# Patient Record
Sex: FEMALE | Race: WHITE | NOT HISPANIC OR LATINO | Employment: OTHER | URBAN - METROPOLITAN AREA
[De-identification: names, ages, dates, MRNs, and addresses within clinical notes are randomized per-mention and may not be internally consistent; named-entity substitution may affect disease eponyms.]

---

## 2023-04-06 LAB
ABO GROUP (TYPE) IN BLOOD: NORMAL
ABO: NORMAL
ANION GAP IN SER/PLAS: 13 MMOL/L (ref 10–20)
ANION GAP SERPL CALCULATED.3IONS-SCNC: 13 MMOL/L (ref 10–20)
ANTIBODY SCREEN: NORMAL
ANTIBODY SCREEN: NORMAL
BICARBONATE: 29 MMOL/L (ref 21–32)
CALCIUM (MG/DL) IN SER/PLAS: 10 MG/DL (ref 8.6–10.3)
CALCIUM SERPL-MCNC: 10 MG/DL (ref 8.6–10.3)
CARBON DIOXIDE, TOTAL (MMOL/L) IN SER/PLAS: 29 MMOL/L (ref 21–32)
CHLORIDE (MMOL/L) IN SER/PLAS: 100 MMOL/L (ref 98–107)
CHLORIDE BLD-SCNC: 100 MMOL/L (ref 98–107)
CREAT SERPL-MCNC: 0.86 MG/DL (ref 0.5–1)
CREATININE (MG/DL) IN SER/PLAS: 0.86 MG/DL (ref 0.5–1.05)
EGFR FEMALE: 70 ML/MIN/1.73M2
ERYTHROCYTE DISTRIBUTION WIDTH (RATIO) BY AUTOMATED COUNT: 12.9 % (ref 11.5–14.5)
ERYTHROCYTE MEAN CORPUSCULAR HEMOGLOBIN CONCENTRATION (G/DL) BY AUTOMATED: 32.5 G/DL (ref 32–36)
ERYTHROCYTE MEAN CORPUSCULAR VOLUME (FL) BY AUTOMATED COUNT: 94 FL (ref 80–100)
ERYTHROCYTE [DISTWIDTH] IN BLOOD BY AUTOMATED COUNT: 12.9 % (ref 11.5–14)
ERYTHROCYTES (10*6/UL) IN BLOOD BY AUTOMATED COUNT: 4.54 X10E12/L (ref 4–5.2)
GFR FEMALE: 70 ML/MIN/1.73M2
GLUCOSE (MG/DL) IN SER/PLAS: 96 MG/DL (ref 74–99)
GLUCOSE: 96 MG/DL (ref 74–99)
HCT VFR BLD CALC: 42.5 % (ref 36–46)
HEMATOCRIT (%) IN BLOOD BY AUTOMATED COUNT: 42.5 % (ref 36–46)
HEMOGLOBIN (G/DL) IN BLOOD: 13.8 G/DL (ref 12–16)
HEMOGLOBIN: 13.8 G/DL (ref 12–16)
LEUKOCYTES (10*3/UL) IN BLOOD BY AUTOMATED COUNT: 8 X10E9/L (ref 4.4–11.3)
MCHC RBC AUTO-ENTMCNC: 32.5 G/DL (ref 32–36)
MCV RBC AUTO: 94 FL (ref 80–100)
NRBC (PER 100 WBCS) BY AUTOMATED COUNT: 0 /100 WBC (ref 0–0)
NUCLEATED RBCS: 0 /100 WBC (ref 0–0)
PLATELET # BLD: 341 X10E9/L (ref 150–450)
PLATELETS (10*3/UL) IN BLOOD AUTOMATED COUNT: 341 X10E9/L (ref 150–450)
POTASSIUM (MMOL/L) IN SER/PLAS: 4.1 MMOL/L (ref 3.5–5.3)
POTASSIUM SERPL-SCNC: 4.1 MMOL/L (ref 3.5–5.3)
RBC # BLD: 4.54 X10E12/L (ref 4–5.2)
RH FACTOR: NORMAL
RH TYPE: NORMAL
SODIUM (MMOL/L) IN SER/PLAS: 138 MMOL/L (ref 136–145)
SODIUM BLD-SCNC: 138 MMOL/L (ref 136–145)
UREA NITROGEN (MG/DL) IN SER/PLAS: 18 MG/DL (ref 6–23)
UREA NITROGEN: 18 MG/DL (ref 6–23)
WBC: 8 X10E9/L (ref 4.4–11.3)

## 2023-04-15 LAB — SARS-COV-2 RESULT: NOT DETECTED

## 2023-04-17 ENCOUNTER — HOSPITAL ENCOUNTER (OUTPATIENT)
Dept: DATA CONVERSION | Facility: HOSPITAL | Age: 73
End: 2023-04-18
Attending: ORTHOPAEDIC SURGERY | Admitting: ORTHOPAEDIC SURGERY
Payer: MEDICARE

## 2023-04-17 DIAGNOSIS — M17.12 UNILATERAL PRIMARY OSTEOARTHRITIS, LEFT KNEE: ICD-10-CM

## 2023-04-17 DIAGNOSIS — M85.662 OTHER CYST OF BONE, LEFT LOWER LEG: ICD-10-CM

## 2023-04-17 DIAGNOSIS — E05.90 THYROTOXICOSIS, UNSPECIFIED WITHOUT THYROTOXIC CRISIS OR STORM: ICD-10-CM

## 2023-04-17 DIAGNOSIS — F32.A DEPRESSION, UNSPECIFIED: ICD-10-CM

## 2023-04-17 DIAGNOSIS — Z79.01 LONG TERM (CURRENT) USE OF ANTICOAGULANTS: ICD-10-CM

## 2023-04-17 DIAGNOSIS — I48.91 UNSPECIFIED ATRIAL FIBRILLATION (MULTI): ICD-10-CM

## 2023-04-17 DIAGNOSIS — H91.90 UNSPECIFIED HEARING LOSS, UNSPECIFIED EAR: ICD-10-CM

## 2023-04-17 DIAGNOSIS — Z90.49 ACQUIRED ABSENCE OF OTHER SPECIFIED PARTS OF DIGESTIVE TRACT: ICD-10-CM

## 2023-04-17 DIAGNOSIS — Z90.710 ACQUIRED ABSENCE OF BOTH CERVIX AND UTERUS: ICD-10-CM

## 2023-04-17 DIAGNOSIS — F41.9 ANXIETY DISORDER, UNSPECIFIED: ICD-10-CM

## 2023-04-17 DIAGNOSIS — K21.9 GASTRO-ESOPHAGEAL REFLUX DISEASE WITHOUT ESOPHAGITIS: ICD-10-CM

## 2023-04-17 DIAGNOSIS — E03.9 HYPOTHYROIDISM, UNSPECIFIED: ICD-10-CM

## 2023-04-17 DIAGNOSIS — Z87.891 PERSONAL HISTORY OF NICOTINE DEPENDENCE: ICD-10-CM

## 2023-04-17 DIAGNOSIS — I10 ESSENTIAL (PRIMARY) HYPERTENSION: ICD-10-CM

## 2023-04-17 LAB
APPEARANCE, URINE: CLEAR
APPEARANCE: CLEAR
BILIRUBIN, URINE: NEGATIVE
BILIRUBIN, URINE: NEGATIVE
BLOOD, URINE: ABNORMAL
BLOOD, URINE: ABNORMAL
COLOR, URINE: YELLOW
COLOR, URINE: YELLOW
GLUCOSE, URINE: NEGATIVE MG/DL
GLUCOSE, URINE: NEGATIVE MG/DL
KETONES, URINE: NEGATIVE MG/DL
KETONES, URINE: NEGATIVE MG/DL
LEUKOCYTE ESTERASE, URINE: NEGATIVE
LEUKOCYTE ESTERASE, URINE: NEGATIVE
NITRITE, URINE: NEGATIVE
NITRITE, URINE: NEGATIVE
PH UA: 5 (ref 5–8)
PH, URINE: 5 (ref 5–8)
PROTEIN UA: ABNORMAL MG/DL
PROTEIN, URINE: ABNORMAL MG/DL
RBC URINE: ABNORMAL /HPF (ref 0–5)
RBC, URINE: ABNORMAL /HPF (ref 0–5)
SPECIFIC GRAVITY, URINE: 1.02 (ref 1–1)
SPECIFIC GRAVITY, URINE: 1.02 (ref 1–1.03)
SPECIMEN SOURCE: ABNORMAL
SPECIMEN SOURCE: ABNORMAL
UROBILINOGEN, URINE: <2 MG/DL (ref 0–1.9)
UROBILINOGEN, URINE: <2 MG/DL (ref 0–1.9)
WBC URINE: ABNORMAL /HPF (ref 0–5)
WBC, URINE: ABNORMAL /HPF (ref 0–5)

## 2023-04-18 LAB
ANION GAP IN SER/PLAS: 13 MMOL/L (ref 10–20)
ANION GAP SERPL CALCULATED.3IONS-SCNC: 13 MMOL/L (ref 10–20)
BASOPHILS # BLD: 0.01 X10E9/L (ref 0–0.1)
BASOPHILS (10*3/UL) IN BLOOD BY AUTOMATED COUNT: 0.01 X10E9/L (ref 0–0.1)
BASOPHILS RELATIVE PERCENT: 0.1 % (ref 0–2)
BASOPHILS/100 LEUKOCYTES IN BLOOD BY AUTOMATED COUNT: 0.1 % (ref 0–2)
BICARBONATE: 26 MMOL/L (ref 21–32)
CALCIUM (MG/DL) IN SER/PLAS: 8.2 MG/DL (ref 8.6–10.3)
CALCIUM SERPL-MCNC: 8.2 MG/DL (ref 8.6–10.3)
CARBON DIOXIDE, TOTAL (MMOL/L) IN SER/PLAS: 26 MMOL/L (ref 21–32)
CHLORIDE (MMOL/L) IN SER/PLAS: 97 MMOL/L (ref 98–107)
CHLORIDE BLD-SCNC: 97 MMOL/L (ref 98–107)
CREAT SERPL-MCNC: 0.77 MG/DL (ref 0.5–1)
CREATININE (MG/DL) IN SER/PLAS: 0.77 MG/DL (ref 0.5–1.05)
EGFR FEMALE: 80 ML/MIN/1.73M2
EOSINOPHIL # BLD: 0 X10E9/L (ref 0–0.4)
EOSINOPHILS (10*3/UL) IN BLOOD BY AUTOMATED COUNT: 0 X10E9/L (ref 0–0.4)
EOSINOPHILS RELATIVE PERCENT: 0 % (ref 0–6)
EOSINOPHILS/100 LEUKOCYTES IN BLOOD BY AUTOMATED COUNT: 0 % (ref 0–6)
ERYTHROCYTE DISTRIBUTION WIDTH (RATIO) BY AUTOMATED COUNT: 13 % (ref 11.5–14.5)
ERYTHROCYTE MEAN CORPUSCULAR HEMOGLOBIN CONCENTRATION (G/DL) BY AUTOMATED: 32.5 G/DL (ref 32–36)
ERYTHROCYTE MEAN CORPUSCULAR VOLUME (FL) BY AUTOMATED COUNT: 92 FL (ref 80–100)
ERYTHROCYTE [DISTWIDTH] IN BLOOD BY AUTOMATED COUNT: 13 % (ref 11.5–14)
ERYTHROCYTES (10*6/UL) IN BLOOD BY AUTOMATED COUNT: 3.9 X10E12/L (ref 4–5.2)
GFR FEMALE: 80 ML/MIN/1.73M2
GLUCOSE (MG/DL) IN SER/PLAS: 173 MG/DL (ref 74–99)
GLUCOSE: 173 MG/DL (ref 74–99)
HCT VFR BLD CALC: 36 % (ref 36–46)
HEMATOCRIT (%) IN BLOOD BY AUTOMATED COUNT: 36 % (ref 36–46)
HEMOGLOBIN (G/DL) IN BLOOD: 11.7 G/DL (ref 12–16)
HEMOGLOBIN: 11.7 G/DL (ref 12–16)
IMMATURE GRANULOCYTES %: 0.6 % (ref 0–0.9)
IMMATURE GRANULOCYTES/100 LEUKOCYTES IN BLOOD BY AUTOMATED COUNT: 0.6 % (ref 0–0.9)
LEUKOCYTES (10*3/UL) IN BLOOD BY AUTOMATED COUNT: 17.7 X10E9/L (ref 4.4–11.3)
LYMPHOCYTES # BLD: 4.2 % (ref 13–44)
LYMPHOCYTES (10*3/UL) IN BLOOD BY AUTOMATED COUNT: 0.74 X10E9/L (ref 0.8–3)
LYMPHOCYTES RELATIVE PERCENT: 0.74 X10E9/L (ref 0.8–3)
LYMPHOCYTES/100 LEUKOCYTES IN BLOOD BY AUTOMATED COUNT: 4.2 % (ref 13–44)
MCHC RBC AUTO-ENTMCNC: 32.5 G/DL (ref 32–36)
MCV RBC AUTO: 92 FL (ref 80–100)
MONOCYTES # BLD: 1.48 X10E9/L (ref 0.05–0.8)
MONOCYTES (10*3/UL) IN BLOOD BY AUTOMATED COUNT: 1.48 X10E9/L (ref 0.05–0.8)
MONOCYTES RELATIVE PERCENT: 8.4 % (ref 2–10)
MONOCYTES/100 LEUKOCYTES IN BLOOD BY AUTOMATED COUNT: 8.4 % (ref 2–10)
NEUTROPHILS (10*3/UL) IN BLOOD BY AUTOMATED COUNT: 15.34 X10E9/L (ref 1.6–5.5)
NEUTROPHILS RELATIVE PERCENT: 86.7 % (ref 40–80)
NEUTROPHILS/100 LEUKOCYTES IN BLOOD BY AUTOMATED COUNT: 86.7 % (ref 40–80)
NEUTROPHILS: 15.34 X10E9/L (ref 1.6–5.5)
NRBC (PER 100 WBCS) BY AUTOMATED COUNT: 0 /100 WBC (ref 0–0)
NUCLEATED RBCS: 0 /100 WBC (ref 0–0)
PLATELET # BLD: 291 X10E9/L (ref 150–450)
PLATELETS (10*3/UL) IN BLOOD AUTOMATED COUNT: 291 X10E9/L (ref 150–450)
POTASSIUM (MMOL/L) IN SER/PLAS: 3.7 MMOL/L (ref 3.5–5.3)
POTASSIUM SERPL-SCNC: 3.7 MMOL/L (ref 3.5–5.3)
RBC # BLD: 3.9 X10E12/L (ref 4–5.2)
SODIUM (MMOL/L) IN SER/PLAS: 132 MMOL/L (ref 136–145)
SODIUM BLD-SCNC: 132 MMOL/L (ref 136–145)
UREA NITROGEN (MG/DL) IN SER/PLAS: 18 MG/DL (ref 6–23)
UREA NITROGEN: 18 MG/DL (ref 6–23)
WBC: 17.7 X10E9/L (ref 4.4–11.3)

## 2023-04-19 LAB
ANION GAP IN SER/PLAS: NORMAL
BASOPHILS (10*3/UL) IN BLOOD BY AUTOMATED COUNT: NORMAL
BASOPHILS/100 LEUKOCYTES IN BLOOD BY AUTOMATED COUNT: NORMAL
CALCIUM (MG/DL) IN SER/PLAS: NORMAL
CARBON DIOXIDE, TOTAL (MMOL/L) IN SER/PLAS: NORMAL
CHLORIDE (MMOL/L) IN SER/PLAS: NORMAL
CREATININE (MG/DL) IN SER/PLAS: NORMAL
EOSINOPHILS (10*3/UL) IN BLOOD BY AUTOMATED COUNT: NORMAL
EOSINOPHILS/100 LEUKOCYTES IN BLOOD BY AUTOMATED COUNT: NORMAL
ERYTHROCYTE DISTRIBUTION WIDTH (RATIO) BY AUTOMATED COUNT: NORMAL
ERYTHROCYTE MEAN CORPUSCULAR HEMOGLOBIN CONCENTRATION (G/DL) BY AUTOMATED: NORMAL
ERYTHROCYTE MEAN CORPUSCULAR VOLUME (FL) BY AUTOMATED COUNT: NORMAL
ERYTHROCYTES (10*6/UL) IN BLOOD BY AUTOMATED COUNT: NORMAL
GFR FEMALE: NORMAL
GFR MALE: NORMAL
GLUCOSE (MG/DL) IN SER/PLAS: NORMAL
HEMATOCRIT (%) IN BLOOD BY AUTOMATED COUNT: NORMAL
HEMOGLOBIN (G/DL) IN BLOOD: NORMAL
IMMATURE GRANULOCYTES/100 LEUKOCYTES IN BLOOD BY AUTOMATED COUNT: NORMAL
LEUKOCYTES (10*3/UL) IN BLOOD BY AUTOMATED COUNT: NORMAL
LYMPHOCYTES (10*3/UL) IN BLOOD BY AUTOMATED COUNT: NORMAL
LYMPHOCYTES/100 LEUKOCYTES IN BLOOD BY AUTOMATED COUNT: NORMAL
MANUAL DIFFERENTIAL Y/N: NORMAL
MONOCYTES (10*3/UL) IN BLOOD BY AUTOMATED COUNT: NORMAL
MONOCYTES/100 LEUKOCYTES IN BLOOD BY AUTOMATED COUNT: NORMAL
NEUTROPHILS (10*3/UL) IN BLOOD BY AUTOMATED COUNT: NORMAL
NEUTROPHILS/100 LEUKOCYTES IN BLOOD BY AUTOMATED COUNT: NORMAL
NRBC (PER 100 WBCS) BY AUTOMATED COUNT: NORMAL
PLATELETS (10*3/UL) IN BLOOD AUTOMATED COUNT: NORMAL
POTASSIUM (MMOL/L) IN SER/PLAS: NORMAL
SODIUM (MMOL/L) IN SER/PLAS: NORMAL
UREA NITROGEN (MG/DL) IN SER/PLAS: NORMAL

## 2023-07-12 LAB
ANION GAP IN SER/PLAS: 15 MMOL/L (ref 10–20)
CALCIUM (MG/DL) IN SER/PLAS: 10 MG/DL (ref 8.6–10.3)
CARBON DIOXIDE, TOTAL (MMOL/L) IN SER/PLAS: 29 MMOL/L (ref 21–32)
CHLORIDE (MMOL/L) IN SER/PLAS: 100 MMOL/L (ref 98–107)
CREATININE (MG/DL) IN SER/PLAS: 0.81 MG/DL (ref 0.5–1.05)
ERYTHROCYTE DISTRIBUTION WIDTH (RATIO) BY AUTOMATED COUNT: 13.2 % (ref 11.5–14.5)
ERYTHROCYTE MEAN CORPUSCULAR HEMOGLOBIN CONCENTRATION (G/DL) BY AUTOMATED: 31.3 G/DL (ref 32–36)
ERYTHROCYTE MEAN CORPUSCULAR VOLUME (FL) BY AUTOMATED COUNT: 91 FL (ref 80–100)
ERYTHROCYTES (10*6/UL) IN BLOOD BY AUTOMATED COUNT: 4.12 X10E12/L (ref 4–5.2)
GFR FEMALE: 75 ML/MIN/1.73M2
GLUCOSE (MG/DL) IN SER/PLAS: 102 MG/DL (ref 74–99)
HEMATOCRIT (%) IN BLOOD BY AUTOMATED COUNT: 37.4 % (ref 36–46)
HEMOGLOBIN (G/DL) IN BLOOD: 11.7 G/DL (ref 12–16)
LEUKOCYTES (10*3/UL) IN BLOOD BY AUTOMATED COUNT: 8.3 X10E9/L (ref 4.4–11.3)
NRBC (PER 100 WBCS) BY AUTOMATED COUNT: 0 /100 WBC (ref 0–0)
PLATELETS (10*3/UL) IN BLOOD AUTOMATED COUNT: 356 X10E9/L (ref 150–450)
POTASSIUM (MMOL/L) IN SER/PLAS: 4.1 MMOL/L (ref 3.5–5.3)
SODIUM (MMOL/L) IN SER/PLAS: 140 MMOL/L (ref 136–145)
UREA NITROGEN (MG/DL) IN SER/PLAS: 19 MG/DL (ref 6–23)

## 2023-07-24 ENCOUNTER — HOSPITAL ENCOUNTER (OUTPATIENT)
Dept: DATA CONVERSION | Facility: HOSPITAL | Age: 73
End: 2023-07-25
Attending: ORTHOPAEDIC SURGERY | Admitting: ORTHOPAEDIC SURGERY
Payer: MEDICARE

## 2023-07-24 DIAGNOSIS — M17.11 UNILATERAL PRIMARY OSTEOARTHRITIS, RIGHT KNEE: ICD-10-CM

## 2023-07-24 DIAGNOSIS — E66.9 OBESITY, UNSPECIFIED: ICD-10-CM

## 2023-07-24 LAB
APPEARANCE, URINE: ABNORMAL
BILIRUBIN, URINE: NEGATIVE
BLOOD, URINE: NEGATIVE
COLOR, URINE: YELLOW
GLUCOSE, URINE: NEGATIVE MG/DL
KETONES, URINE: NEGATIVE MG/DL
LEUKOCYTE ESTERASE, URINE: NEGATIVE
NITRITE, URINE: NEGATIVE
PH, URINE: 5.5 (ref 5–8)
PROTEIN, URINE: ABNORMAL MG/DL
SPECIFIC GRAVITY, URINE: >1.03 (ref 1–1.03)
UROBILINOGEN, URINE: <2 MG/DL (ref 0–1.9)

## 2023-07-25 LAB
ANION GAP IN SER/PLAS: 13 MMOL/L (ref 10–20)
BASOPHILS (10*3/UL) IN BLOOD BY AUTOMATED COUNT: 0.01 X10E9/L (ref 0–0.1)
BASOPHILS/100 LEUKOCYTES IN BLOOD BY AUTOMATED COUNT: 0.1 % (ref 0–2)
CALCIUM (MG/DL) IN SER/PLAS: 7.6 MG/DL (ref 8.6–10.3)
CARBON DIOXIDE, TOTAL (MMOL/L) IN SER/PLAS: 23 MMOL/L (ref 21–32)
CHLORIDE (MMOL/L) IN SER/PLAS: 99 MMOL/L (ref 98–107)
CREATININE (MG/DL) IN SER/PLAS: 0.66 MG/DL (ref 0.5–1.05)
EOSINOPHILS (10*3/UL) IN BLOOD BY AUTOMATED COUNT: 0 X10E9/L (ref 0–0.4)
EOSINOPHILS/100 LEUKOCYTES IN BLOOD BY AUTOMATED COUNT: 0 % (ref 0–6)
ERYTHROCYTE DISTRIBUTION WIDTH (RATIO) BY AUTOMATED COUNT: 14 % (ref 11.5–14.5)
ERYTHROCYTE MEAN CORPUSCULAR HEMOGLOBIN CONCENTRATION (G/DL) BY AUTOMATED: 32.1 G/DL (ref 32–36)
ERYTHROCYTE MEAN CORPUSCULAR VOLUME (FL) BY AUTOMATED COUNT: 89 FL (ref 80–100)
ERYTHROCYTES (10*6/UL) IN BLOOD BY AUTOMATED COUNT: 3.43 X10E12/L (ref 4–5.2)
GFR FEMALE: >90 ML/MIN/1.73M2
GLUCOSE (MG/DL) IN SER/PLAS: 221 MG/DL (ref 74–99)
HEMATOCRIT (%) IN BLOOD BY AUTOMATED COUNT: 30.5 % (ref 36–46)
HEMOGLOBIN (G/DL) IN BLOOD: 9.8 G/DL (ref 12–16)
IMMATURE GRANULOCYTES/100 LEUKOCYTES IN BLOOD BY AUTOMATED COUNT: 0.6 % (ref 0–0.9)
LEUKOCYTES (10*3/UL) IN BLOOD BY AUTOMATED COUNT: 15.7 X10E9/L (ref 4.4–11.3)
LYMPHOCYTES (10*3/UL) IN BLOOD BY AUTOMATED COUNT: 1.1 X10E9/L (ref 0.8–3)
LYMPHOCYTES/100 LEUKOCYTES IN BLOOD BY AUTOMATED COUNT: 7 % (ref 13–44)
MONOCYTES (10*3/UL) IN BLOOD BY AUTOMATED COUNT: 1.15 X10E9/L (ref 0.05–0.8)
MONOCYTES/100 LEUKOCYTES IN BLOOD BY AUTOMATED COUNT: 7.3 % (ref 2–10)
NEUTROPHILS (10*3/UL) IN BLOOD BY AUTOMATED COUNT: 13.34 X10E9/L (ref 1.6–5.5)
NEUTROPHILS/100 LEUKOCYTES IN BLOOD BY AUTOMATED COUNT: 85 % (ref 40–80)
NRBC (PER 100 WBCS) BY AUTOMATED COUNT: 0 /100 WBC (ref 0–0)
PLATELETS (10*3/UL) IN BLOOD AUTOMATED COUNT: 273 X10E9/L (ref 150–450)
POTASSIUM (MMOL/L) IN SER/PLAS: 4.2 MMOL/L (ref 3.5–5.3)
SODIUM (MMOL/L) IN SER/PLAS: 131 MMOL/L (ref 136–145)
UREA NITROGEN (MG/DL) IN SER/PLAS: 19 MG/DL (ref 6–23)

## 2023-09-07 VITALS — HEIGHT: 61 IN | BODY MASS INDEX: 32.88 KG/M2 | WEIGHT: 174.16 LBS

## 2023-09-14 NOTE — H&P
History & Physical Reviewed:   I have reviewed the History and Physical dated:  17-Apr-2023   History and Physical reviewed and relevant findings noted. Patient examined to review pertinent physical  findings.: No significant changes   Home Medications Reviewed: no changes noted   Allergies Reviewed: no changes noted       ERAS (Enhanced Recovery After Surgery):  ·  ERAS Patient: no     Consent:   COVID-19 Consent:  ·  COVID-19 Risk Consent Surgeon has reviewed key risks related to the risk of sp COVID-19 and if they contract COVID-19 what the risks are.       Electronic Signatures:  Kannan Moraes)  (Signed 17-Apr-2023 13:12)   Authored: History & Physical Reviewed, ERAS, Consent,  Note Completion      Last Updated: 17-Apr-2023 13:12 by Kannan Moraes)

## 2023-09-14 NOTE — PROGRESS NOTES
Service: Orthopaedics     Subjective Data:   DAVID TOLLIVER is a 73 year old Female who is Hospital Day # 2 and POD #1 for Left total knee replacement, hybrid (cemented tibia, cementless patella and femur).    Additional Information:    Patient is resting comfortably in bed. She reports mild pain at left knee. She denies CP, SOB, N/V, numbness and tingling. She has voided since surgery. She is on  1L NC, and this morning when taken off O2 she desaturated to the 80s. She was placed back on NC and will attempt to wean as tolerated.    Objective Data:     Objective Information:      T   P  R  BP   MAP  SpO2   Value  36.5  81  17  100/53      95%  Date/Time 4/18 7:55 4/18 7:55 4/18 7:55 4/18 7:55    4/18 7:55  Range  (35.5C - 36.5C )  (77 - 98 )  (16 - 18 )  (100 - 140 )/ (53 - 80 )    (93% - 98% )   As of 18-Apr-2023 09:47:00, patient is on 1 L/min of oxygen via nasal cannula.      Pain reported at 4/18 11:50: 2 = Mild    Physical Exam Narrative   ·  Physical Exam:    Constitutional: Well developed, awake/alert, no distress, alert and cooperative  Eyes: EOMI, clear sclera  ENMT: mucous membranes moist, no apparent injury  Head/Neck: normocephalic, atraumatic  Respiratory/Thorax: CTAB, Patent airways, thorax symmetric  Cardiovascular: regular rate and rhythm  Musculoskeletal: Left knee dressing changed. Site was dry and clean. Bilateral lower extremities distally with intact dorsiflexion, plantarflexion, sensation and motor strength. Dorsalis pedis pulses present bilaterally.  Neurological: alert, intact senses  Psychological: Appropriate mood and behavior  Skin: Warm and dry, no rashes.     Medication     Medications:          Continuous Medications       --------------------------------    1. Sodium Chloride 0.9% Infusion:  1000  mL  IntraVenous  <Continuous>         Scheduled Medications       --------------------------------    1. Acetaminophen:  650  mg  Oral  Every 6 Hours    2. ceFAZolin 2 gram/ D5W 100 mL  Premix IVPB:  100  mL  IntraVenous Piggyback  Once    3. Docusate:  100  mg  Oral  2 Times a Day    4. hydroCHLOROthiazide:  25  mg  Oral  Daily    5. Levothyroxine:  75  microgram(s)  Oral  Daily    6. Lisinopril:  20  mg  Oral  Daily    7. Nebivolol (NON-Formulary):  2.5  mg  Oral  Daily    8. Polyethylene Glycol:  17  gram(s)  Oral  2 Times a Day    9. Rivaroxaban:  10  mg  Oral  Daily         PRN Medications       --------------------------------    1. Ketorolac Injectable:  15  mg  IntraVenous Push  Every 6 Hours    2. Morphine Injectable:  2  mg  IntraVenous Push  Every 4 Hours    3. Ondansetron Injectable:  4  mg  IntraVenous Push  Every 6 Hours    4. oxyCODONE Immediate Release:  5  mg  Oral  Every 4 Hours    5. oxyCODONE Immediate Release:  10  mg  Oral  Every 4 Hours    6. Zolpidem:  5  mg  Oral  At Bedtime        Recent Lab Results     Results:    CBC: 4/18/2023 05:43              \     Hgb     /                              \     11.7 L    /  WBC  ----------------  Plt               17.7 H    ----------------    291              /     Hct     \                              /     36.0       \            RBC: 3.90 L    MCV: 92     Neutrophil %: 86.7      BMP: 4/18/2023 05:43  NA+        Cl-     BUN  /                         132 L   97 L    18  /  --------------------------------  Glucose                ---------------------------  173 H    K+     HCO3-   Creat \                         3.7  26    0.77  \  Calcium : 8.2 L    Anion Gap : 13      Assessment and Plan:   Daily Risk Screen   ·  Does patient have an indwelling urinary catheter? yes   ·  Plan for indwelling urinary catheter removal today? yes          Admitting Dx:   Osteoarthritis of left knee: Entered Date: 17-Apr-2023 13:06       Medical History:   Hyperthyroidism: Entered Date: 05-Apr-2023 08:44   Hypertension: Entered Date: 05-Apr-2023 08:42   Atrial fibrillation: Entered Date: 05-Apr-2023 08:42       Surg History:   History of  thyroid surgery: Entered Date: 05-Apr-2023 09:05   History of hysterectomy: Entered Date: 05-Apr-2023 09:05   History of foot surgery: Entered Date: 05-Apr-2023 09:05   History of cholecystectomy: Entered Date: 05-Apr-2023 09:05   History of bunionectomy: Entered Date: 05-Apr-2023 09:04   History of bladder surgery: Entered Date: 05-Apr-2023 09:04    Code Status   ·  Code Status Full Code       Impression 1: Osteoarthritis of left knee   Plan for Impression 1: Postop day #1 status post  left total knee arthroplasty  PT/OT, full weightbearing of left lower extremity  Pain regimen: Good pain control with scheduled Tylenol and as needed Toradol and oxycodone  Wean off O2 as tolerated  Bowel regimen: Docusate and Miralax  Hemoglobin: 11.7  VTE prophylaxis: Xarelto 10 mg once daily and SCDs   disposition: When appropriate, will discharge home with home health care  Follow up with Dr. Moraes in 2 weeks     Attestation:   Note Completion   I am a:  PA Student   PA Student Attestation I was present with the PA student who participated in the documentation of this note.  I have personally seen and re-examined the patient and performed the  medical decision-making components (assessment and plan of care). I have reviewed the PA student documentation and verified the findings in the note as written with additions or exceptions as stated in the body of this note.   I personally evaluated the patient on 18-Apr-2023       Electronic Signatures for Addendum Section:   Kannan Moraes) (Signed Addendum 18-Apr-2023 16:17)   agree with assessment, OK FOR DC today     Electronic Signatures:  Ekaterina Galarza (PAC)  (Signed 18-Apr-2023 13:16)   Authored: Assessment and Plan, Note Completion   Co-Signer: Service, Subjective Data, Objective Data, Assessment and Plan, Note Completion  Promise Guallpa (ROSIE)  (Signed 18-Apr-2023 12:33)   Authored: Service, Subjective Data, Objective Data, Assessment  and Plan, Note Completion      Last Updated:  18-Apr-2023 16:17 by Kannan Moraes)

## 2023-09-14 NOTE — DISCHARGE SUMMARY
Send Summary:   Discharge Summary Providers:  Provider Role Provider Name   · Attending Kannan Moraes   · Referring Kannan Moraes   · Primary Behmer, Mary E       Note Recipients: Behmer, Mary E, MD - [id number]  []  Kannan Moraes MD       Discharge:    Summary:   Admission Date: .17-Apr-2023 11:21:00   Discharge Date: 18-Apr-2023   Attending Physician at Discharge: Kannan Moraes   Admission Reason: osteoarthritis left knee   Final Discharge Diagnoses: Osteoarthritis of left  knee   Procedures: Date: 17-Apr-2023 15:24:00  Procedure Name: Left total knee replacement, hybrid (cemented tibia, cementless patella and femur)   Condition at Discharge: Satisfactory   Disposition at Discharge: Home Health Care - New   Vital Signs:        T   P  R  BP   MAP  SpO2   Value  36.7  85  17  108/58      92%  Date/Time 4/18 11:30 4/18 11:30 4/18 11:30 4/18 11:30    4/18 11:30  Range  (35.5C - 36.7C )  (77 - 98 )  (16 - 18 )  (100 - 140 )/ (53 - 80 )    (92% - 98% )   As of 18-Apr-2023 09:47:00, patient is on 1 L/min of oxygen via nasal cannula.    Date:            Weight/Scale Type:  Height:   17-Apr-2023 17:50  79  kg / bed  154.7  cm  Physical Exam:    ·  Physical Exam:    Constitutional: Well developed, awake/alert, no distress, alert and cooperative  Eyes: EOMI, clear sclera  ENMT: mucous membranes moist, no apparent injury  Head/Neck: normocephalic, atraumatic  Respiratory/Thorax: CTAB, Patent airways, thorax symmetric  Cardiovascular: regular rate and rhythm  Musculoskeletal: Left knee dressing changed. Site was dry and clean. Bilateral lower extremities distally with intact dorsiflexion, plantarflexion, sensation and motor strength. Dorsalis pedis pulses present bilaterally.  Neurological: alert, intact senses  Psychological: Appropriate mood and behavior  Skin: Warm and dry, no rashes.   Hospital Course:    Hospital day #2, postoperative day #1 of  left total knee arthroplasty for osteoarthritis left knee.  Patient had  satisfactory postop course with no major complications.   She fully participated in physical and Occupational Therapy.  Used her incentive spirometry as directed.  Tolerated diet with no nausea vomiting, passed flatus, and urinated well.  Discharge to home with home health care in satisfactory condition.      Discharge Information:    and Continuing Care:   Lab Results - Pending:    None  Radiology Results - Pending: None   Discharge Instructions:    Activity:           activity as tolerated.          May shower..            May not return to school/work.            May not drive.      Wound Care:           Wound Site:   left knee Incision          Wound Type:   surgical incision          Instructions:   no lotions, creams, or tub soaks          Other Instructions:   your dressing is waterproof, you may shower with it on  remove your left knee incisional dressing on 4/24/23, leave open to air if your incision is dry    Additional Orders:           Additional Instructions:   Call Dr. Moraes for any problems and/or concerns.  *Weight bearing as tolerated with walker  *Maintain knee precautions  *No pillow under affected knee  *Raise (elevate) your leg above the level of your heart while you are sitting or lying down (place pillow underneath calf)  *You may shower, do not soak or scrub incision; pat dry  *Apply ice to affected knee as needed to minimize swelling, on 20 minutes, off 20 minutes  *Move your toes often to avoid stiffness and to lessen swelling  *Avoid sitting for a long time without moving. Get up to take short walks every 1-2 hours. This is important to improve blood flow and breathing. Ask for help if you feel weak or unsteady  *Continue the coughing and deep breathing exercises that you learned in the hospital    Call Doctor right away for:  *Fever of 100.4 or above, shaking chills  *Stiffness, or inability to move the knee  *Increased swelling in your leg  *Increased redness, tenderness, or swelling in or  around the knee incision  *Drainage from the knee incision  *Increased knee pain  *An incision that breaks open  *Chest pain/shortness of breath-call 911    After the procedure you can expect pain, swelling, and limited range of motion    Narcotic pain medication may cause constipation. You may need to take actions to prevent or treat constipation, such as:  -drink enough fluid to keep your urine pale yellow  -take over-the-counter or prescription medicines  -eat foods that are high in fiber, such as beans, whole grains, and fresh fruits and vegetables  -limit foods that are high in fat and processed sugars, such as fried or sweet foods    Take your blood thinner (anticoagulant) as prescribed by your physician to prevent blood clots.   If your physician prescribed MAYTE Hose (compression stockings)-wear as instructed as they will help reduce swelling and the formation of blood clots    Do not use any products that contain nicotine or tobacco, such as cigarettes, e-cigarettes, and chewing tobacco. These can delay healing after surgery. If you need help quitting, ask your health care provider.        Home Care Certification:           Home Care Agency:   Other (with phone number)          Skilled Disciplines Ordered:   PT,  OT    Home Care Services:           Home Care Skilled Service:   Rehab (PT/OT/SP eval and treat)    Follow Up Appointments:    Follow-Up Appointment 01:           Physician/Dept/Service:   Dr. Kannan Moraes          Reason for Referral:   first post op visit/incision check          Call to Schedule in:   2 weeks          Phone Number:   [phone]    Discharge Medications: Home Medication   Calcium 600+D - 2 tab(s) orally once a day  rivaroxaban 10 mg oral tablet - 1 tab(s) orally once a day for 6 days, then resume home Xarelto 20 mg daily  rivaroxaban 20 mg oral tablet - 1 tab(s) orally once a day (in the evening), starting on 4/24  docusate sodium 100 mg oral capsule - 1 cap(s) orally 2 times a day  -for constipation   levothyroxine 75 mcg (0.075 mg) oral tablet - 1 tab(s) orally once a day  lisinopril-hydrochlorothiazide 20 mg-25 mg oral tablet - 1 tab(s) orally once a day  rosuvastatin 20 mg oral tablet - 1 tab(s) orally once a day  Vitamin D3 25 mcg (1000 intl units) oral tablet - 1 tab(s) orally once a day     PRN Medication   zolpidem 5 mg oral tablet - 1 tab(s) orally once a day (at bedtime), As Needed  oxyCODONE 5 mg oral tablet - 1 tab(s) orally every 6 hours, As Needed -Pain - moderate to severe     DNR Status:   ·  Code Status Code Status order at time of discharge: Full Code       Electronic Signatures:  Ekaterina Galarza (PAC)  (Signed 18-Apr-2023 14:41)   Authored: Send Summary, Summary Content, Ongoing Care,  DNR Status, Note Completion      Last Updated: 18-Apr-2023 14:41 by Ekaterina Galarza (PAC)

## 2023-09-29 VITALS — HEIGHT: 61 IN | WEIGHT: 207.23 LBS | BODY MASS INDEX: 39.13 KG/M2

## 2023-09-30 NOTE — DISCHARGE SUMMARY
Send Summary:   Discharge Summary Providers:  Provider Role Provider Name   · Attending Kannan Moraes   · Referring Kannan Moraes   · Primary Behmer, Mary E       Note Recipients: Behmer, Mary E, MD - [id number]  []  Kannan Moraes MD       Discharge:    Summary:   Admission Date: .24-Jul-2023 10:51:00   Discharge Date: 26-Jul-2023   Attending Physician at Discharge: Kannan Moraes   Admission Reason: right knee osteoarthritis   Final Discharge Diagnoses: Osteoarthritis of right  knee   Procedures: Date: 24-Jul-2023 15:46:00  Procedure Name: Right Total Knee Replacement   Condition at Discharge: Satisfactory   Disposition at Discharge: Home Health Care - New   Vital Signs:        T   P  R  BP   MAP  SpO2   Value  36.5  84  18  130/67      93%  Date/Time 7/25 16:00 7/25 16:00 7/25 16:00 7/25 16:00    7/25 16:00  Range  (36.1C - 36.5C )  (63 - 84 )  (16 - 18 )  (106 - 130 )/ (57 - 67 )    (93% - 96% )    Date:            Weight/Scale Type:  Height:   24-Jul-2023 18:06  94  kg / standing  154.7  cm  Physical Exam:    Constitutional: Well developed, awake/alert,  no distress, alert and cooperative   Eyes: EOMI, clear sclera   ENMT: mucous membranes moist, no lesions  seen   Respiratory/Thorax: Patent airways, with  good chest expansion, thorax symmetric   Musculoskeletal: Right knee dressing dry  and intact.   Bilateral lower extremities distally with intact dorsiflexion/plantarflexion/EHL.  DP palpable 2+/2   Neurological: alert,  intact senses   Lymphatic: No significant lymphadenopathy   Psychological: Appropriate mood and behavior     Hospital Course:    Hospital day #2, postoperative day #1 of right total knee arthroplasty for osteoarthritis right knee.  Patient had satisfactory postop course with no major complications.   She fully participated in physical and Occupational Therapy.  Used her incentive spirometry as directed.  Tolerated diet with no nausea vomiting, passed flatus, and urinated well.  Discharge to  home with home health care in satisfactory condition.      Discharge Information:    and Continuing Care:   Lab Results - Pending:    None  Radiology Results - Pending: None   Discharge Instructions:    Activity:           activity as tolerated.          May shower..            May not return to school/work until follow-up visit with.            May not drive until follow-up visit.            No pushing, pulling, or lifting objects greater than 10 pounds until follow-up visit.            Weight-bearing Instructions: full weight bearing.      Nutrition/Diet:           resume normal diet    Wound Care:           Wound Type:   surgical incision          Cleanse With:   soap and water          Instructions:   no lotions, creams, or tub soaks          Other Instructions:   Remove surgical dressing post op day #7 and replace with a new dressing.    Additional Orders:           Additional Instructions:   Call Dr. Moraes for any problems and/or concerns.  *Weight bearing as tolerated with walker  *Maintain knee precautions  *No pillow under affected knee  *Raise (elevate) your leg above the level of your heart while you are sitting or lying down (place pillow underneath calf)  *You may shower, do not soak or scrub incision; pat dry  *Change mepilex dressing post operative day #7 to new dressing  *If you have a polar care cooling device, use as instructed  *Apply ice to affected knee as needed to minimize swelling, on 20 minutes, off 20 minutes  *Move your toes often to avoid stiffness and to lessen swelling  *Avoid sitting for a long time without moving. Get up to take short walks every 1-2 hours. This is important to improve blood flow and breathing. Ask for help if you feel weak or unsteady  *Continue the coughing and deep breathing exercises that you learned in the hospital    Call Doctor right away for:  *Fever of 100.4 or above, shaking chills  *Stiffness, or inability to move the knee  *Increased swelling in your  leg  *Increased redness, tenderness, or swelling in or around the knee incision  *Drainage from the knee incision  *Increased knee pain  *An incision that breaks open  *Chest pain/shortness of breath-call 911    After the procedure you can expect pain, swelling, and limited range of motion    Narcotic pain medication may cause constipation. You may need to take actions to prevent or treat constipation, such as:  -drink enough fluid to keep your urine pale yellow  -take over-the-counter or prescription medicines  -eat foods that are high in fiber, such as beans, whole grains, and fresh fruits and vegetables  -limit foods that are high in fat and processed sugars, such as fried or sweet foods    Take your blood thinner (anticoagulant) as prescribed by your physician to prevent blood clots.   If your physician prescribed MAYTE Hose (compression stockings)-wear as instructed as they will help reduce swelling and the formation of blood clots    Do not use any products that contain nicotine or tobacco, such as cigarettes, e-cigarettes, and chewing tobacco. These can delay healing after surgery. If you need help quitting, ask your health care provider      Home Care Certification:           Home Care Agency:   Other (with phone number)   [facility]          Skilled Disciplines Ordered:   PT,  OT    Home Care Services:           Home Care Skilled Service:   Rehab (PT/OT/SP eval and treat)    Follow Up Appointments:    Follow-Up Appointment 02:           Physician/Dept/Service:   Dr. Moraes          Reason for Referral:   first post op visit/incision check          Call to Schedule in:   3 weeks          Phone Number:   [phone]    Discharge Medications: Home Medication   Calcium 600+D - 2 tab(s) orally once a day  rivaroxaban 20 mg oral tablet - 1 tab(s) orally once a day (in the evening  XARELTO)  levothyroxine 75 mcg (0.075 mg) oral tablet - 1 tab(s) orally once a day  lisinopril-hydrochlorothiazide 20 mg-25 mg oral tablet -  1 tab(s) orally once a day  rosuvastatin 20 mg oral tablet - 1 tab(s) orally once a day  Vitamin D3 25 mcg (1000 intl units) oral tablet - 1 tab(s) orally once a day  nebivolol 2.5 mg oral tablet - 1 tab(s) orally once a day  celecoxib 100 mg oral capsule - 1 cap(s) orally 2 times a day  docusate sodium 100 mg oral capsule - 1 cap(s) orally 2 times a day -for constipation      PRN Medication   oxyCODONE 5 mg oral tablet - 1 tab(s) orally every 6 hours, As Needed -Pain - moderate to severe     DNR Status:   ·  Code Status Code Status order at time of discharge: Full Code       Electronic Signatures:  Ekaterina Galarza (PAC)  (Signed 26-Jul-2023 17:30)   Authored: Send Summary, Summary Content, Ongoing Care,  DNR Status, Note Completion      Last Updated: 26-Jul-2023 17:30 by Ekaterina Galarza (PAC)

## 2023-09-30 NOTE — PROGRESS NOTES
Service: Orthopaedics     Subjective Data:   DAVID TOLLIVER is a 73 year old Female who is Hospital Day # 2 and POD #1 for Right Total Knee Replacement.    Additional Information:    Ms. Tolliver is struggling with right knee pain this morning, is requesting ibuprofen.  She is voiding without issues and denies nausea.  She is complaining of that  her IV is burning.    Objective Data:     Objective Information:      T   P  R  BP   MAP  SpO2   Value  36.1  63  16  106/57      96%  Date/Time 7/25 8:00 7/25 8:00 7/25 8:00 7/25 8:00    7/25 8:00  Range  (36C - 36.7C )  (63 - 77 )  (16 - 18 )  (106 - 134 )/ (57 - 66 )    (96% - 97% )   As of 25-Jul-2023 00:00:00, patient is on 2 L/min of oxygen via nasal cannula.      Pain reported at 7/25 4:59: 2 = Mild    Physical Exam by System:    Constitutional: Well developed, awake/alert, no distress,  alert and cooperative   Eyes: EOMI, clear sclera   ENMT: mucous membranes moist, no lesions seen   Respiratory/Thorax: Patent airways, with good chest  expansion, thorax symmetric   Musculoskeletal: Right knee dressing dry and intact.    Bilateral lower extremities distally with intact dorsiflexion/plantarflexion/EHL.  DP palpable 2+/2   Neurological: alert,  intact senses   Lymphatic: No significant lymphadenopathy   Psychological: Appropriate mood and behavior     Medication:    Medications:          Continuous Medications       --------------------------------    1. Sodium Chloride 0.9% Infusion:  1000  mL  IntraVenous  <Continuous>         Scheduled Medications       --------------------------------    1. Acetaminophen:  650  mg  Oral  Every 6 Hours    2. ceFAZolin 2 gram/ D5W 100 mL Premix IVPB:  100  mL  IntraVenous Piggyback  Once    3. Docusate:  100  mg  Oral  2 Times a Day    4. hydroCHLOROthiazide:  25  mg  Oral  Daily    5. Levothyroxine:  75  microgram(s)  Oral  Daily    6. Lisinopril:  20  mg  Oral  Daily    7. Metoprolol Succinate Extended Release:  25  mg  Oral   Daily    8. Polyethylene Glycol:  17  gram(s)  Oral  2 Times a Day    9. Rivaroxaban:  20  mg  Oral  Daily 1800         PRN Medications       --------------------------------    1. Ketorolac Injectable:  15  mg  IntraVenous Push  Every 6 Hours    2. Morphine Injectable:  2  mg  IntraVenous Push  Every 4 Hours    3. Ondansetron Injectable:  4  mg  IntraVenous Push  Every 6 Hours    4. oxyCODONE Immediate Release:  5  mg  Oral  Every 4 Hours    5. oxyCODONE Immediate Release:  10  mg  Oral  Every 4 Hours        Recent Lab Results:    Results:    CBC: 7/25/2023 05:35              \     Hgb     /                              \     9.8 L    /  WBC  ----------------  Plt               15.7 H    ----------------    273              /     Hct     \                              /     30.5 L    \            RBC: 3.43 L    MCV: 89     Neutrophil %: 85.0      BMP: 7/25/2023 05:35  NA+        Cl-     BUN  /                         131 L   99    19  /  --------------------------------  Glucose                ---------------------------  221 H    K+     HCO3-   Creat \                         4.2  23    0.66  \  Calcium : 7.6 L    Anion Gap : 13      Assessment and Plan:   Daily Risk Screen:  ·  Does patient have an indwelling urinary catheter? yes   ·  Plan for indwelling urinary catheter removal today? yes     Comorbidities:  ·  Comorbidity obesity   ·  Obesity obesity (BMI 35-39.9)   ·  BMI 39.28     Code Status:  ·  Code Status Full Code       Impression 1: Osteoarthritis of right knee   Plan for Impression 1: Postop day #1 status post  right total knee arthroplasty  pain control issues today  PT/OT, weightbearing as tolerated right lower extremity  Pain regimen: Multi modal pain regimen, we will add Celebrex to her pain regimen and reassess  Bowel regimen: Colace and MiraLAX  Hemoglobin: 9.8  VTE prophylaxis: Xarelto 20 mg twice daily and SCDs   disposition: When appropriate, will discharge home with home health care,  face-to-face is completed  Up with Dr. Moraes in 2 to 3 weeks       Electronic Signatures:  Ekaterina Galarza (PAC)  (Signed 25-Jul-2023 11:41)   Authored: Service, Subjective Data, Objective Data, Assessment  and Plan, Note Completion      Last Updated: 25-Jul-2023 11:41 by Ekaterina Galarza (PAC)

## 2023-10-02 NOTE — OP NOTE
Post Operative Note:     PreOp Diagnosis: left knee osteoarthritis   Post-Procedure Diagnosis: Same   Procedure: Left total knee replacement, hybrid (cemented  tibia, cementless patella and femur)   Surgeon: Dr Kannan Moraes   Resident/Fellow/Other Assistant: Ayah Suarez   Anesthesia: GET   Estimated Blood Loss (mL): less than 300cc   Specimen: no   Complications: none   Findings: large cyst left proximal tibia   Tourniquet Times: 26 minutes   Additional Details: The patient was brought to the  operating suite identified and induced into successful general endotracheal anesthesia. Next the left lower extremity was prepped and draped in the usual sterile fashion distal to a well-padded tourniquet. The limb was then exsanguinated of blood using  gravity, and the tourniquet was then inflated to 250 mmhg. a timeout was then performed.  Incision was made on the anterior aspect of the knee midline dissection was carried out through the subcutaneous tissue until the extensor mechanism was then encountered. A medial parapatellar arthrotomy was performed using the Bovie cautery, the patella  was carefully inverted revealing the following arthritic findings: Severe arthritis involving the patellofemoral joint with large ridges in the lateral and medial facet of the patella as well as the trochlea.  There is also severe arthritis involving  the lateral compartment.  Total knee replacement then began by making a drill hole in the intercondylar notch, the intramedullary kimberlee with a distal femoral cutting guide was set at 4 degrees of valgus. Distal femur was then cut using an oscillating saw.  The rotational guide was then inserted and the distal femur was sized to a number 2. The anterior posterior and chamfer cuts were then made using an oscillating saw.  The anterior cruciate ligament was removed, PCL was protected, and a medial release was performed and the proximal tibial subluxed anteriorally. The  proximal tibial cut was then made using an oscillating saw neutral medial to lateral with a slight posterior  slope.  After the cut was made there was found to be an extremely large cyst involving the medial central portion of the proximal tibia, this was completely evacuated of its contents back to stable bone.  The undersurface of the patella was also removed  using oscillating saw. The following implants were then used to trial reduced the knee: 2 femoral component, 2 tibial component, 32 patella, 9 mm insert.  The knee was then found to be very balanced in all ranges and for this reason the surfaces of bone were prepared by drilling  holes in the distal femur and the undersurface of the patella, A V keel slot was made in the proximal tibia.  Central drill  was then made for the post of the cemented tibial component.  Simplex cement was then mixed, and when it had reached proper viscosity, it was applied to the exposed prepared bone.   The  implants were inserted in the following order : #2 tibia baseplate                         this was held in place until the cement completely hardened.  This was found to be resecured and the 9 mm next 3 cruciate retaining tibial polyethylene was  inserted and secured.  The patella, 32, press-fit was inserted onto a flat surface of the patella and 3 bilaterals, and the femoral component was inserted onto the end of the distal femur, press-fit.  There was found to be bone with excellent bone implant  contact, making sure there was no soft tissue impingement. The tourniquet was then released at this time and all obvious bleeding points were carefully coagulated.  The medial parapatellar incision was closed using #1 Stratafix. The subcutaneous tissue was closed with interrupted and running absorbable suture, and a Prineo dressing was then used to seal the incision. Sterile dressing applied the patient was then  aroused from anesthesia and transferred to the postanesthesia  care unit in stable condition recover fully from this anesthesia. All instrument and needle counts were correct.  The first assistant, physician assistant, was present for the entire operation and was a key portion of the surgical team, being responsible for aiding in positioning exposure vital organ protection and closure.       Electronic Signatures:  Kannan Moraes)  (Signed 17-Apr-2023 15:33)   Authored: Post Operative Note, Note Completion      Last Updated: 17-Apr-2023 15:33 by Kannan Moraes)

## 2023-10-02 NOTE — OP NOTE
Post Operative Note:     PreOp Diagnosis: right knee osteoarthritis   Post-Procedure Diagnosis: same   Procedure: Right Total Knee Replacement   Surgeon: Dr Kannan Moraes   Resident/Fellow/Other Assistant: No Mo   Anesthesia: GET   Estimated Blood Loss (mL): less than 200cc   Specimen: no   Complications: None   Findings: see operative note   Drains and/or Catheters: No drain   Tourniquet Times: 7 minutes   Additional Details: The patient was brought to the  operating suite identified and induced into successful general endotracheal anaesthesia. Next the right lower extremity was prepped and draped in the usual sterile fashion distal to a well-padded tourniquet. The limb was then exsanguinated of blood using  gravity, and the tourniquet was then inflated to 250 mmhg. A timeout was then performed.  Incision was made on the anterior aspect of the knee, midline dissection was carried out through the subcutaneous tissue until the extensor mechanism was then encountered. A medial parapatellar arthrotomy was performed using the Bovie cautery, the patella  was carefully inverted revealing the following arthritic findings: Severe osteoarthritis particularly involving the patellofemoral joint with erosions into the patella, and severe arthritis involving the medial compartment.. Total knee replacement then  began by making a drill hole in the intracondylar notch, the intramedullary kimberlee with a distal femoral cutting guide was set at 4 degrees of valgus.  This was pressed approximately 5 cm until the tip of the kimberlee hit the tip of the intramedullary kimberlee that  was already present in the patient's femur.  Distal femur was then cut using an oscillating saw. The rotational guide was then inserted and the distal femur was sized to a number 3. The anterior posterior and chamfer cuts were then made using an oscillating  saw.  The anterior cruciate ligament was removed. The PCL was  protected a medial release was performed  and the proximal tibial subluxed anteriorally. The proximal tibial cut was then made using an oscillating saw, neutral medial to lateral with a slight posterior  slope. The undersurface of the patella was also removed using oscillating saw. The following implants were then used to trial reduced the knee: 3 femoral component, 3 tibial component, 32 mm patella, 11 mm liner trial  The knee was then found to be very balanced in all ranges and for this reason the surfaces of bone were prepared by drilling  holes in the distal femur and the undersurface of the patella, A V keel slot and 4  holes were drilled in the proximal  tibia.   The tourniquet was then released at this time and all obvious bleeding points were carefully coagulated. The  implants were inserted in the following order : A 3 triathlon Tritanium Tibia baseplate, 11 mm trial insert, 3 triathlon Press Fit Cruciate Retaining  femoral component, and the 32 mm triathlon Tritanium metal backed patellar component. Once all implants were confirmed to be bone to bone the permanent 11 mm Traithlon CR polyethylene insert was then secured to the tibial baseplate, making sure there  was no soft tissue impingement.  The medial parapatellar incision was closed using figure-of-eight absorbable sutures, and running barbed #1 monofilament suture. The subcutaneous tissue was closed with interrupted and running absorbable suture, and a Prineo dressing was then used to  seal the incision. Sterile dressing applied the patient was then aroused from anesthesia and transferred to the postanesthesia care unit in stable condition recover fully from this anesthesia. All instrument and needle counts were correct.  The first assistant, physician assistant, was present for the entire operation and was a key portion of the surgical team, being responsible for aiding in positioning exposure vital organ protection and closure.       Electronic Signatures:  Kannan Moraes)   (Signed 24-Jul-2023 15:53)   Authored: Post Operative Note, Note Completion      Last Updated: 24-Jul-2023 15:53 by Kannan Moraes)

## 2024-02-01 PROBLEM — I51.9 MILD DIASTOLIC DYSFUNCTION: Status: ACTIVE | Noted: 2024-02-01

## 2024-02-01 PROBLEM — E05.90 HYPERTHYROIDISM: Status: ACTIVE | Noted: 2024-02-01

## 2024-02-01 PROBLEM — I10 BENIGN ESSENTIAL HYPERTENSION: Status: ACTIVE | Noted: 2024-02-01

## 2024-02-01 PROBLEM — T78.40XA ALLERGIC REACTION TO DRUG: Status: ACTIVE | Noted: 2024-02-01

## 2024-02-01 PROBLEM — I48.0 PAROXYSMAL ATRIAL FIBRILLATION (MULTI): Status: ACTIVE | Noted: 2024-02-01

## 2024-02-01 RX ORDER — LISINOPRIL AND HYDROCHLOROTHIAZIDE 20; 25 MG/1; MG/1
1 TABLET ORAL DAILY
COMMUNITY

## 2024-02-01 RX ORDER — LEVOTHYROXINE SODIUM 75 UG/1
75 TABLET ORAL DAILY
COMMUNITY

## 2024-02-01 RX ORDER — ROSUVASTATIN CALCIUM 20 MG/1
20 TABLET, COATED ORAL
COMMUNITY

## 2024-02-01 RX ORDER — ZOLPIDEM TARTRATE 5 MG/1
5 TABLET ORAL NIGHTLY PRN
COMMUNITY

## 2024-02-01 RX ORDER — NEBIVOLOL HYDROCHLORIDE 2.5 MG/1
2.5 TABLET ORAL DAILY
COMMUNITY
Start: 2018-06-22

## 2024-02-01 RX ORDER — RIVAROXABAN 20 MG/1
20 TABLET, FILM COATED ORAL DAILY
COMMUNITY
End: 2024-03-05 | Stop reason: ALTCHOICE

## 2024-02-01 RX ORDER — OXYCODONE HYDROCHLORIDE 5 MG/1
5 TABLET ORAL
COMMUNITY
Start: 2023-04-18 | End: 2024-03-05 | Stop reason: ALTCHOICE

## 2024-02-15 ENCOUNTER — HOSPITAL ENCOUNTER (EMERGENCY)
Facility: HOSPITAL | Age: 74
Discharge: HOME | End: 2024-02-15
Attending: STUDENT IN AN ORGANIZED HEALTH CARE EDUCATION/TRAINING PROGRAM
Payer: MEDICARE

## 2024-02-15 ENCOUNTER — APPOINTMENT (OUTPATIENT)
Dept: CARDIOLOGY | Facility: HOSPITAL | Age: 74
End: 2024-02-15
Payer: MEDICARE

## 2024-02-15 ENCOUNTER — APPOINTMENT (OUTPATIENT)
Dept: RADIOLOGY | Facility: HOSPITAL | Age: 74
End: 2024-02-15
Payer: MEDICARE

## 2024-02-15 VITALS
RESPIRATION RATE: 18 BRPM | TEMPERATURE: 98.4 F | BODY MASS INDEX: 35.87 KG/M2 | HEIGHT: 61 IN | WEIGHT: 190 LBS | OXYGEN SATURATION: 94 % | HEART RATE: 78 BPM | DIASTOLIC BLOOD PRESSURE: 71 MMHG | SYSTOLIC BLOOD PRESSURE: 164 MMHG

## 2024-02-15 DIAGNOSIS — D64.9 ANEMIA, UNSPECIFIED TYPE: Primary | ICD-10-CM

## 2024-02-15 LAB
ABO GROUP (TYPE) IN BLOOD: NORMAL
ALBUMIN SERPL BCP-MCNC: 4 G/DL (ref 3.4–5)
ALP SERPL-CCNC: 68 U/L (ref 33–136)
ALT SERPL W P-5'-P-CCNC: 12 U/L (ref 7–45)
ANION GAP SERPL CALC-SCNC: 18 MMOL/L (ref 10–20)
ANTIBODY SCREEN: NORMAL
APTT PPP: 34 SECONDS (ref 27–38)
AST SERPL W P-5'-P-CCNC: 17 U/L (ref 9–39)
BASOPHILS # BLD AUTO: 0.05 X10*3/UL (ref 0–0.1)
BASOPHILS NFR BLD AUTO: 0.5 %
BILIRUB SERPL-MCNC: 0.3 MG/DL (ref 0–1.2)
BLOOD EXPIRATION DATE: NORMAL
BUN SERPL-MCNC: 32 MG/DL (ref 6–23)
CALCIUM SERPL-MCNC: 9.6 MG/DL (ref 8.6–10.3)
CARDIAC TROPONIN I PNL SERPL HS: 5 NG/L (ref 0–13)
CARDIAC TROPONIN I PNL SERPL HS: 5 NG/L (ref 0–13)
CHLORIDE SERPL-SCNC: 99 MMOL/L (ref 98–107)
CO2 SERPL-SCNC: 24 MMOL/L (ref 21–32)
CREAT SERPL-MCNC: 0.9 MG/DL (ref 0.5–1.05)
D DIMER PPP FEU-MCNC: 332 NG/ML FEU
DISPENSE STATUS: NORMAL
EGFRCR SERPLBLD CKD-EPI 2021: 66 ML/MIN/1.73M*2
EOSINOPHIL # BLD AUTO: 0.33 X10*3/UL (ref 0–0.4)
EOSINOPHIL NFR BLD AUTO: 3.1 %
ERYTHROCYTE [DISTWIDTH] IN BLOOD BY AUTOMATED COUNT: 18 % (ref 11.5–14.5)
GLUCOSE SERPL-MCNC: 163 MG/DL (ref 74–99)
HCT VFR BLD AUTO: 24.1 % (ref 36–46)
HGB BLD-MCNC: 7 G/DL (ref 12–16)
HOLD SPECIMEN: NORMAL
IMM GRANULOCYTES # BLD AUTO: 0.03 X10*3/UL (ref 0–0.5)
IMM GRANULOCYTES NFR BLD AUTO: 0.3 % (ref 0–0.9)
INR PPP: 1.9 (ref 0.9–1.1)
LYMPHOCYTES # BLD AUTO: 1.82 X10*3/UL (ref 0.8–3)
LYMPHOCYTES NFR BLD AUTO: 17.1 %
MAGNESIUM SERPL-MCNC: 1.66 MG/DL (ref 1.6–2.4)
MCH RBC QN AUTO: 20.1 PG (ref 26–34)
MCHC RBC AUTO-ENTMCNC: 29 G/DL (ref 32–36)
MCV RBC AUTO: 69 FL (ref 80–100)
MONOCYTES # BLD AUTO: 1.61 X10*3/UL (ref 0.05–0.8)
MONOCYTES NFR BLD AUTO: 15.1 %
NEUTROPHILS # BLD AUTO: 6.81 X10*3/UL (ref 1.6–5.5)
NEUTROPHILS NFR BLD AUTO: 63.9 %
NRBC BLD-RTO: 0 /100 WBCS (ref 0–0)
PLATELET # BLD AUTO: 325 X10*3/UL (ref 150–450)
POTASSIUM SERPL-SCNC: 3.8 MMOL/L (ref 3.5–5.3)
PRODUCT BLOOD TYPE: 600
PRODUCT CODE: NORMAL
PROT SERPL-MCNC: 7.3 G/DL (ref 6.4–8.2)
PROTHROMBIN TIME: 21.8 SECONDS (ref 9.8–12.8)
RBC # BLD AUTO: 3.48 X10*6/UL (ref 4–5.2)
RH FACTOR (ANTIGEN D): NORMAL
SODIUM SERPL-SCNC: 137 MMOL/L (ref 136–145)
UNIT ABO: NORMAL
UNIT NUMBER: NORMAL
UNIT RH: NORMAL
UNIT VOLUME: 350
WBC # BLD AUTO: 10.7 X10*3/UL (ref 4.4–11.3)
XM INTEP: NORMAL

## 2024-02-15 PROCEDURE — 83735 ASSAY OF MAGNESIUM: CPT

## 2024-02-15 PROCEDURE — 84484 ASSAY OF TROPONIN QUANT: CPT

## 2024-02-15 PROCEDURE — 85379 FIBRIN DEGRADATION QUANT: CPT | Performed by: STUDENT IN AN ORGANIZED HEALTH CARE EDUCATION/TRAINING PROGRAM

## 2024-02-15 PROCEDURE — 99285 EMERGENCY DEPT VISIT HI MDM: CPT | Performed by: STUDENT IN AN ORGANIZED HEALTH CARE EDUCATION/TRAINING PROGRAM

## 2024-02-15 PROCEDURE — 36430 TRANSFUSION BLD/BLD COMPNT: CPT

## 2024-02-15 PROCEDURE — 36415 COLL VENOUS BLD VENIPUNCTURE: CPT

## 2024-02-15 PROCEDURE — 80053 COMPREHEN METABOLIC PANEL: CPT

## 2024-02-15 PROCEDURE — 85610 PROTHROMBIN TIME: CPT

## 2024-02-15 PROCEDURE — 99284 EMERGENCY DEPT VISIT MOD MDM: CPT | Mod: 25 | Performed by: STUDENT IN AN ORGANIZED HEALTH CARE EDUCATION/TRAINING PROGRAM

## 2024-02-15 PROCEDURE — 71045 X-RAY EXAM CHEST 1 VIEW: CPT | Mod: FOREIGN READ | Performed by: RADIOLOGY

## 2024-02-15 PROCEDURE — 86920 COMPATIBILITY TEST SPIN: CPT

## 2024-02-15 PROCEDURE — 93005 ELECTROCARDIOGRAM TRACING: CPT

## 2024-02-15 PROCEDURE — P9016 RBC LEUKOCYTES REDUCED: HCPCS

## 2024-02-15 PROCEDURE — P9040 RBC LEUKOREDUCED IRRADIATED: HCPCS

## 2024-02-15 PROCEDURE — 86901 BLOOD TYPING SEROLOGIC RH(D): CPT | Performed by: STUDENT IN AN ORGANIZED HEALTH CARE EDUCATION/TRAINING PROGRAM

## 2024-02-15 PROCEDURE — 99283 EMERGENCY DEPT VISIT LOW MDM: CPT | Mod: 25

## 2024-02-15 PROCEDURE — 85025 COMPLETE CBC W/AUTO DIFF WBC: CPT

## 2024-02-15 PROCEDURE — 71045 X-RAY EXAM CHEST 1 VIEW: CPT

## 2024-02-15 ASSESSMENT — COLUMBIA-SUICIDE SEVERITY RATING SCALE - C-SSRS
1. IN THE PAST MONTH, HAVE YOU WISHED YOU WERE DEAD OR WISHED YOU COULD GO TO SLEEP AND NOT WAKE UP?: NO
2. HAVE YOU ACTUALLY HAD ANY THOUGHTS OF KILLING YOURSELF?: NO
6. HAVE YOU EVER DONE ANYTHING, STARTED TO DO ANYTHING, OR PREPARED TO DO ANYTHING TO END YOUR LIFE?: NO

## 2024-02-15 ASSESSMENT — PAIN SCALES - GENERAL
PAINLEVEL_OUTOF10: 0 - NO PAIN

## 2024-02-15 ASSESSMENT — LIFESTYLE VARIABLES
HAVE YOU EVER FELT YOU SHOULD CUT DOWN ON YOUR DRINKING: NO
EVER HAD A DRINK FIRST THING IN THE MORNING TO STEADY YOUR NERVES TO GET RID OF A HANGOVER: NO
HAVE PEOPLE ANNOYED YOU BY CRITICIZING YOUR DRINKING: NO
EVER FELT BAD OR GUILTY ABOUT YOUR DRINKING: NO

## 2024-02-15 ASSESSMENT — PAIN - FUNCTIONAL ASSESSMENT: PAIN_FUNCTIONAL_ASSESSMENT: 0-10

## 2024-02-15 NOTE — ED PROVIDER NOTES
EMERGENCY DEPARTMENT ENCOUNTER      Pt Name: Jennifer Boogie  MRN: [id number]  Birthdate 1950  Date of evaluation: 2/15/2024  Provider: Marshal Jack DO    CHIEF COMPLAINT       Chief Complaint   Patient presents with    Low h/h         HISTORY OF PRESENT ILLNESS    Patient is a 74-year-old female presenting with chief complaint of anemia.  She was advised to come to the ER by her primary care provider after she had labs drawn that showed anemia of 7.2.  She states that she has had issues with anemia for the past few months.  She had her hemoglobin checked in November and it was 8.6 around that time, she also had a colonoscopy and endoscopy performed at that time.  GI that did not show any abnormalities, bleeding or polyps.  She has been feeling short of breath over the past few months as well that is been progressively worsening.  Otherwise denies any chest pain.  No lightheadedness dizziness.  No abdominal pain nausea vomiting.  No melena hematochezia.  No dysuria hematuria.          Nursing Notes were reviewed.    PAST MEDICAL HISTORY     Past Medical History:   Diagnosis Date    A-fib (CMS/HCC)     Hypertension          SURGICAL HISTORY       Past Surgical History:   Procedure Laterality Date    OTHER SURGICAL HISTORY  08/03/2020    Thyroid surgery    OTHER SURGICAL HISTORY  08/03/2020    Hysterectomy    OTHER SURGICAL HISTORY  08/03/2020    Foot surgery    OTHER SURGICAL HISTORY  08/03/2020    Cholecystectomy    OTHER SURGICAL HISTORY  08/03/2020    Bunionectomy    OTHER SURGICAL HISTORY  08/03/2020    Bladder surgery         CURRENT MEDICATIONS       Discharge Medication List as of 2/15/2024 10:40 PM        CONTINUE these medications which have NOT CHANGED    Details   Bystolic 2.5 mg tablet Take 1 tablet (2.5 mg) by mouth once daily., Starting Fri 6/22/2018, Historical Med      chlorhexidine (Peridex) 0.12 % solution SWISH AND SPIT 15 MLS THE NIGHT BEFORE SURGERY AND SWISH AND SPIT 15 MLS SWISH AND SPIT THE  MORNING OF SURGERY DO NOT SWALLOW, Starting Fri 7/7/2023, Until Sat 7/6/2024 at 2359, Normal      levothyroxine (Synthroid, Levoxyl) 75 mcg tablet Take 1 tablet (75 mcg) by mouth once daily., Historical Med      lisinopriL-hydrochlorothiazide 20-25 mg tablet Take 1 tablet by mouth once daily., Historical Med      oxyCODONE (Roxicodone) 5 mg immediate release tablet 1 tablet (5 mg)., Starting Tue 4/18/2023, Historical Med      rosuvastatin (Crestor) 20 mg tablet Take 1 tablet (20 mg) by mouth., Historical Med      Xarelto 20 mg tablet Take 1 tablet (20 mg) by mouth once daily., Historical Med      zolpidem (Ambien) 5 mg tablet Take 1 tablet (5 mg) by mouth as needed at bedtime., Historical Med             ALLERGIES     Patient has no known allergies.    FAMILY HISTORY     No family history on file.       SOCIAL HISTORY       Social History     Socioeconomic History    Marital status:      Spouse name: None    Number of children: None    Years of education: None    Highest education level: None   Occupational History    None   Tobacco Use    Smoking status: Never    Smokeless tobacco: Never   Substance and Sexual Activity    Alcohol use: Not Currently    Drug use: Not Currently    Sexual activity: None   Other Topics Concern    None   Social History Narrative    None     Social Determinants of Health     Financial Resource Strain: Not on file   Food Insecurity: Not on file   Transportation Needs: Not on file   Physical Activity: Not on file   Stress: Not on file   Social Connections: Not on file   Intimate Partner Violence: Not on file   Housing Stability: Not on file       SCREENINGS                        PHYSICAL EXAM    (up to 7 for level 4, 8 or more for level 5)     ED Triage Vitals [02/15/24 1456]   Temperature Heart Rate Respirations BP   36.5 °C (97.7 °F) 100 16 136/63      Pulse Ox Temp Source Heart Rate Source Patient Position   97 % Temporal Monitor Sitting      BP Location FiO2 (%)     Right arm --        Physical Exam  Vitals and nursing note reviewed.   Constitutional:       General: She is not in acute distress.     Appearance: Normal appearance. She is not ill-appearing or toxic-appearing.   HENT:      Head: Normocephalic and atraumatic.      Right Ear: External ear normal.      Left Ear: External ear normal.      Nose: Nose normal.   Eyes:      General:         Right eye: No discharge.         Left eye: No discharge.      Extraocular Movements: Extraocular movements intact.      Conjunctiva/sclera: Conjunctivae normal.      Pupils: Pupils are equal, round, and reactive to light.   Cardiovascular:      Rate and Rhythm: Normal rate and regular rhythm.      Pulses: Normal pulses.      Heart sounds: Normal heart sounds. No murmur heard.  Pulmonary:      Effort: Pulmonary effort is normal.      Breath sounds: Normal breath sounds.   Abdominal:      General: There is no distension.      Palpations: Abdomen is soft. There is no mass.      Tenderness: There is no abdominal tenderness. There is no guarding.   Musculoskeletal:         General: No deformity or signs of injury. Normal range of motion.   Skin:     General: Skin is warm and dry.   Neurological:      General: No focal deficit present.      Mental Status: She is alert and oriented to person, place, and time. Mental status is at baseline.   Psychiatric:         Mood and Affect: Mood normal.         Behavior: Behavior normal.          DIAGNOSTIC RESULTS     LABS:  Labs Reviewed   CBC WITH AUTO DIFFERENTIAL - Abnormal       Result Value    WBC 10.7      nRBC 0.0      RBC 3.48 (*)     Hemoglobin 7.0 (*)     Hematocrit 24.1 (*)     MCV 69 (*)     MCH 20.1 (*)     MCHC 29.0 (*)     RDW 18.0 (*)     Platelets 325      Neutrophils % 63.9      Immature Granulocytes %, Automated 0.3      Lymphocytes % 17.1      Monocytes % 15.1      Eosinophils % 3.1      Basophils % 0.5      Neutrophils Absolute 6.81 (*)     Immature Granulocytes Absolute, Automated 0.03       Lymphocytes Absolute 1.82      Monocytes Absolute 1.61 (*)     Eosinophils Absolute 0.33      Basophils Absolute 0.05     COMPREHENSIVE METABOLIC PANEL - Abnormal    Glucose 163 (*)     Sodium 137      Potassium 3.8      Chloride 99      Bicarbonate 24      Anion Gap 18      Urea Nitrogen 32 (*)     Creatinine 0.90      eGFR 66      Calcium 9.6      Albumin 4.0      Alkaline Phosphatase 68      Total Protein 7.3      AST 17      Bilirubin, Total 0.3      ALT 12     COAGULATION SCREEN - Abnormal    Protime 21.8 (*)     INR 1.9 (*)     aPTT 34      Narrative:     The APTT is no longer used for monitoring Unfractionated Heparin Therapy. For monitoring Heparin Therapy, use the Heparin Assay.   MAGNESIUM - Normal    Magnesium 1.66     SERIAL TROPONIN-INITIAL - Normal    Troponin I, High Sensitivity 5      Narrative:     Less than 99th percentile of normal range cutoff-  Female and children under 18 years old <14 ng/L; Male <21 ng/L: Negative  Repeat testing should be performed if clinically indicated.     Female and children under 18 years old 14-50 ng/L; Male 21-50 ng/L:  Consistent with possible cardiac damage and possible increased clinical   risk. Serial measurements may help to assess extent of myocardial damage.     >50 ng/L: Consistent with cardiac damage, increased clinical risk and  myocardial infarction. Serial measurements may help assess extent of   myocardial damage.      NOTE: Children less than 1 year old may have higher baseline troponin   levels and results should be interpreted in conjunction with the overall   clinical context.     NOTE: Troponin I testing is performed using a different   testing methodology at [facility] than at other   [facility] hospitals. Direct result comparisons should only   be made within the same method.   SERIAL TROPONIN, 1 HOUR - Normal    Troponin I, High Sensitivity 5      Narrative:     Less than 99th percentile of normal range cutoff-  Female and children under  18 years old <14 ng/L; Male <21 ng/L: Negative  Repeat testing should be performed if clinically indicated.     Female and children under 18 years old 14-50 ng/L; Male 21-50 ng/L:  Consistent with possible cardiac damage and possible increased clinical   risk. Serial measurements may help to assess extent of myocardial damage.     >50 ng/L: Consistent with cardiac damage, increased clinical risk and  myocardial infarction. Serial measurements may help assess extent of   myocardial damage.      NOTE: Children less than 1 year old may have higher baseline troponin   levels and results should be interpreted in conjunction with the overall   clinical context.     NOTE: Troponin I testing is performed using a different   testing methodology at [facility] than at other   [facility]. Direct result comparisons should only   be made within the same method.   D-DIMER, VTE EXCLUSION - Normal    D-Dimer, Quantitative VTE Exclusion 332      Narrative:     The VTE Exclusion D-Dimer assay is reported in ng/mL Fibrinogen Equivalent Units (FEU).    Per 's instructions for use, a value of less than 500 ng/mL (FEU) may help to exclude DVT or PE in outpatients when the assay is used with a clinical pretest probability assessment.(AE must utilize and document eCalc 'Wells Score Deep Vein Thrombosis Risk' for DVT exclusion only. Emergency Department should utilize  Guidelines for Emergency Department Use of the VTE Exclusion D-Dimer and Clinical Pretest probability assessment model for DVT or PE exclusion.)   TROPONIN SERIES- (INITIAL, 1 HR)    Narrative:     The following orders were created for panel order Troponin I Series, High Sensitivity (0, 1 HR).  Procedure                               Abnormality         Status                     ---------                               -----------         ------                     Troponin I, High Sensiti...[[id number]]  Normal              Final result                Troponin, High Sensitivi...[[id number]]  Normal              Final result                 Please view results for these tests on the individual orders.   TYPE AND SCREEN    ABO TYPE A      Rh TYPE POS      ANTIBODY SCREEN NEG     PREPARE RBC    PRODUCT CODE [id number]      Unit Number [id number]      Unit ABO A      Unit RH NEG      XM INTEP COMP      Dispense Status TR      Blood Expiration Date February 16, 2024 23:59 EST      PRODUCT BLOOD TYPE 0600      UNIT VOLUME 350         All other labs were within normal range or not returned as of this dictation.    Imaging  XR chest 1 view   Final Result   No acute process.   Signed by Federico Alberto MD           Procedures  Procedures     EMERGENCY DEPARTMENT COURSE/MDM:   Medical Decision Making  74-year-old female presenting to complaint of anemia.  Advised coming by her primary care provider. Vital signs reviewed through triage and are overall unremarkable.  Hemodynamically he is stable.  Blood pressure is normal.  Heart rate is very borderline tachycardic at 100.  Respirations 16.  Saturating normally.  Afebrile.  Otherwise is unremarkable appearing on physical exam.  Patient does complain of some shortness of breath is a progressive worsening.  This may most likely be related to her anemia.  She is on Xarelto currently.  Will obtain usual cardiac workup.  If her hemoglobin is dropped below 7, will consider transfusion.        Please see ED course for additional MDM.    ED Course as of 02/17/24 1733   Thu Feb 15, 2024   1606 D-dimer was added to rule out evidence of PE given that she had recently been off of her Xarelto for a dental procedure. [CL]   1606 Hemoglobin is decreased to 7 today. [CL]   1628 D-dimer negative [CL]   1654 Discussed case with  consultants who state no intervention at this time. Patient hb 7.0 on blood thinners. Patient to be transfused and ambulated, given pcp and GI followup. Patient agreeable with plan, understands plans, and  has no questions [AS]   1655 Patient was discussed with on-call gastroenterology, Dr. Soliz, who does not feel that this patient warrants emergent evaluation by GI service, but does feel that she would benefit from receiving a unit of blood and following up with her outpatient.  Order placed for 1 unit PRBCs.  Patient will be ambulated following this to determine if she is can still symptomatic.  If she is able to tolerate ambulation well patient be discharged with instructions for close outpatient follow-up. [CL]   1853 Troponin normal x 2. [CL]   2130 Patient tolerated ambulation after receiving her blood transfusion and was discharged in stable condition. [CL]      ED Course User Index  [AS] Rashi Worthington DO  [CL] Marshal Jack DO         Diagnoses as of 02/17/24 1733   Anemia, unspecified type        Patient and or family in agreement and understanding of treatment plan.  All questions answered.      I reviewed the case with the attending ED physician. The attending ED physician agrees with the plan. Patient and/or patient´s representative was counseled regarding labs, imaging, likely diagnosis, and plan. All questions were answered.    ED Medications administered this visit:  Medications - No data to display    (Please note that portions of this note were completed with a voice recognition program.  Efforts were made to edit the dictations but occasionally words are mis-transcribed.)     Marshal Jack DO  Resident  02/17/24 1733

## 2024-02-15 NOTE — ED NOTES
1654 Lavonne from blood bank called 1 unit of blood ready  Nurse Alyssa is aware      Lida Villagran, EMT  02/15/24 [id number]

## 2024-02-15 NOTE — ED TRIAGE NOTES
Pt had labs drawn at PCP office yesterday. Referred from PCP due to hemoglobin of 7.2   Pt is on Xarelto.

## 2024-02-16 NOTE — ED NOTES
Patient ambulated post prbc. Patient hr 99, oix 97% after ambulating.      Alyssa Srivastava RN  02/15/24 8899

## 2024-02-16 NOTE — DISCHARGE INSTRUCTIONS
Follow up with your doctor(s) in one to two days.  Follow up and review all labs and imaging results with your doctor(s)    Please return to this or the nearest Emergency Medical facility for any new or worsening symptoms.    If you were given a prescription medication, you should review all risks and side effects on the package insert and discuss these and the medication with your pharmacist

## 2024-02-19 LAB
ATRIAL RATE: 87 BPM
ATRIAL RATE: 89 BPM
P AXIS: 29 DEGREES
P AXIS: 32 DEGREES
P OFFSET: 177 MS
P OFFSET: 177 MS
P ONSET: 125 MS
P ONSET: 125 MS
PR INTERVAL: 176 MS
PR INTERVAL: 178 MS
Q ONSET: 213 MS
Q ONSET: 214 MS
QRS COUNT: 14 BEATS
QRS COUNT: 15 BEATS
QRS DURATION: 86 MS
QRS DURATION: 90 MS
QT INTERVAL: 372 MS
QT INTERVAL: 374 MS
QTC CALCULATION(BAZETT): 447 MS
QTC CALCULATION(BAZETT): 455 MS
QTC FREDERICIA: 420 MS
QTC FREDERICIA: 426 MS
R AXIS: -37 DEGREES
R AXIS: -39 DEGREES
T AXIS: 31 DEGREES
T AXIS: 38 DEGREES
T OFFSET: 400 MS
T OFFSET: 400 MS
VENTRICULAR RATE: 87 BPM
VENTRICULAR RATE: 89 BPM

## 2024-03-05 ENCOUNTER — OFFICE VISIT (OUTPATIENT)
Dept: CARDIOLOGY | Facility: CLINIC | Age: 74
End: 2024-03-05
Payer: MEDICARE

## 2024-03-05 VITALS
HEIGHT: 61 IN | HEART RATE: 83 BPM | OXYGEN SATURATION: 98 % | SYSTOLIC BLOOD PRESSURE: 128 MMHG | DIASTOLIC BLOOD PRESSURE: 78 MMHG | WEIGHT: 191 LBS | BODY MASS INDEX: 36.06 KG/M2

## 2024-03-05 DIAGNOSIS — I48.0 PAROXYSMAL ATRIAL FIBRILLATION (MULTI): Primary | ICD-10-CM

## 2024-03-05 PROCEDURE — 3078F DIAST BP <80 MM HG: CPT | Performed by: INTERNAL MEDICINE

## 2024-03-05 PROCEDURE — 1159F MED LIST DOCD IN RCRD: CPT | Performed by: INTERNAL MEDICINE

## 2024-03-05 PROCEDURE — 99214 OFFICE O/P EST MOD 30 MIN: CPT | Performed by: INTERNAL MEDICINE

## 2024-03-05 PROCEDURE — 1036F TOBACCO NON-USER: CPT | Performed by: INTERNAL MEDICINE

## 2024-03-05 PROCEDURE — 3074F SYST BP LT 130 MM HG: CPT | Performed by: INTERNAL MEDICINE

## 2024-03-05 PROCEDURE — 1126F AMNT PAIN NOTED NONE PRSNT: CPT | Performed by: INTERNAL MEDICINE

## 2024-03-05 RX ORDER — FERROUS SULFATE 325(65) MG
65 TABLET, DELAYED RELEASE (ENTERIC COATED) ORAL
COMMUNITY

## 2024-03-05 RX ORDER — LANOLIN ALCOHOL/MO/W.PET/CERES
1000 CREAM (GRAM) TOPICAL DAILY
COMMUNITY
Start: 2024-02-16

## 2024-03-05 RX ORDER — ASPIRIN 81 MG/1
81 TABLET ORAL DAILY
Qty: 30 TABLET | Refills: 11 | Status: SHIPPED | OUTPATIENT
Start: 2024-03-05 | End: 2025-03-05

## 2024-03-05 NOTE — PROGRESS NOTES
Name : Jennifer Boogie   : 1950   MRN : [id number]   ENC Date : 2024      Assessment and Plan:  Paroxysmal atrial fibrillation: No clinical recurrence.  Given the severe anemia and no clinical recurrence of the atrial fibrillation and without an obvious source for the anemia I think it is reasonable to stop her Xarelto at this point.  I would recommend referral to EP for consideration of watchman implant.  I will have her see Dr. Renee to discuss this as an option.  Iron deficiency anemia: It is possible she has not actually had a bleeding source.  She may have gotten anemic from her knee surgeries and then if she had iron deficiency simply had worsening hemoglobin levels due to gradual red blood cell breakdown and not actually due to bleeding.  No source was found with upper and lower endoscopy.  Neck step of course would be small bowel capsule endoscopy if the iron deficiency anemia persists.  Mild diastolic dysfunction: Patient has some fatigue but no real heart failure symptoms.  No need for change in medications at this point.  Hypertension: Blood pressure is well-controlled recommend no changes  Disp: Referral to EP for consideration of watchman implant, otherwise RTO in 6 months    HPI:  Patient is seen today.  Since last seen she has had considerable problems with anemia.  She also ended up with a blood transfusion when her hemoglobin dropped to 7.  She was symptomatic with the anemia mostly presenting as shortness of breath with exertion.  Since being transfused her hemoglobin has improved.  She did undergo upper and lower endoscopy which did not find a bleeding source.  Her hemoglobin is actually steadily dropped since her knee surgery a year ago.  It is possible she became iron deficient at that point and never was repleted with iron and over time she is simply had expected red blood cells loss without an actual bleeding source.  She has not had any melena.  She has had no episodes of atrial  "fibrillation.  No TIA or CVA-like symptoms.    Problem list overview:   Patient Active Problem List   Diagnosis    Allergic reaction to drug    Benign essential hypertension    Hyperthyroidism    Mild diastolic dysfunction    Paroxysmal atrial fibrillation (CMS/HCC)       Meds:   Current Outpatient Medications on File Prior to Visit   Medication Sig Dispense Refill    Bystolic 2.5 mg tablet Take 1 tablet (2.5 mg) by mouth once daily.      cyanocobalamin (Vitamin B-12) 1,000 mcg tablet Take 1 tablet (1,000 mcg) by mouth once daily.      ferrous sulfate 325 (65 Fe) MG EC tablet Take 65 mg by mouth 3 times a day with meals. Do not crush, chew, or split.      levothyroxine (Synthroid, Levoxyl) 75 mcg tablet Take 1 tablet (75 mcg) by mouth once daily.      lisinopriL-hydrochlorothiazide 20-25 mg tablet Take 1 tablet by mouth once daily.      rosuvastatin (Crestor) 20 mg tablet Take 1 tablet (20 mg) by mouth.      zolpidem (Ambien) 5 mg tablet Take 1 tablet (5 mg) by mouth as needed at bedtime.      [DISCONTINUED] Xarelto 20 mg tablet Take 1 tablet (20 mg) by mouth once daily.      chlorhexidine (Peridex) 0.12 % solution SWISH AND SPIT 15 MLS THE NIGHT BEFORE SURGERY AND SWISH AND SPIT 15 MLS SWISH AND SPIT THE MORNING OF SURGERY DO NOT SWALLOW (Patient not taking: Reported on 3/5/2024) 30 mL 0    [DISCONTINUED] oxyCODONE (Roxicodone) 5 mg immediate release tablet 1 tablet (5 mg).       No current facility-administered medications on file prior to visit.        VS:  /78 (BP Location: Left arm, Patient Position: Sitting)   Pulse 83   Ht 1.549 m (5' 1\")   Wt 86.6 kg (191 lb)   SpO2 98%   BMI 36.09 kg/m²     Vitals reviewed.   Neck:      Vascular: No JVD.   Pulmonary:      Effort: Pulmonary effort is normal.      Breath sounds: Normal breath sounds.   Cardiovascular:      Normal rate. Regular rhythm.      Murmurs: There is no murmur.      No gallop.    Pulses:     Intact distal pulses.   Edema:     Peripheral " edema absent.   Abdominal:      General: Abdomen is flat.      Palpations: Abdomen is soft.   Neurological:      General: No focal deficit present.      Mental Status: Alert.   Psychiatric:         Mood and Affect: Mood normal.       Dylan Sequeira MD

## 2024-04-02 ENCOUNTER — APPOINTMENT (OUTPATIENT)
Dept: CARDIOLOGY | Facility: CLINIC | Age: 74
End: 2024-04-02
Payer: MEDICARE

## 2024-04-22 ENCOUNTER — HOSPITAL ENCOUNTER (OUTPATIENT)
Dept: RADIOLOGY | Facility: HOSPITAL | Age: 74
Discharge: HOME | End: 2024-04-22
Payer: MEDICARE

## 2024-04-22 DIAGNOSIS — D50.9 IRON DEFICIENCY ANEMIA, UNSPECIFIED: ICD-10-CM

## 2024-04-22 PROCEDURE — A9698 NON-RAD CONTRAST MATERIALNOC: HCPCS | Performed by: NURSE PRACTITIONER

## 2024-04-22 PROCEDURE — 2500000001 HC RX 250 WO HCPCS SELF ADMINISTERED DRUGS (ALT 637 FOR MEDICARE OP): Performed by: NURSE PRACTITIONER

## 2024-04-22 PROCEDURE — 74246 X-RAY XM UPR GI TRC 2CNTRST: CPT

## 2024-04-22 PROCEDURE — 2500000004 HC RX 250 GENERAL PHARMACY W/ HCPCS (ALT 636 FOR OP/ED): Performed by: NURSE PRACTITIONER

## 2024-04-22 RX ADMIN — BARIUM SULFATE 90 ML: 960 POWDER, FOR SUSPENSION ORAL at 08:56

## 2024-04-22 RX ADMIN — BARIUM SULFATE 150 ML: 980 POWDER, FOR SUSPENSION ORAL at 08:57

## 2024-09-09 ENCOUNTER — APPOINTMENT (OUTPATIENT)
Dept: CARDIOLOGY | Facility: CLINIC | Age: 74
End: 2024-09-09
Payer: MEDICARE

## 2024-10-22 ENCOUNTER — APPOINTMENT (OUTPATIENT)
Dept: CARDIOLOGY | Facility: CLINIC | Age: 74
End: 2024-10-22
Payer: MEDICARE

## 2024-10-22 VITALS
OXYGEN SATURATION: 98 % | WEIGHT: 185 LBS | DIASTOLIC BLOOD PRESSURE: 80 MMHG | SYSTOLIC BLOOD PRESSURE: 102 MMHG | HEIGHT: 61 IN | BODY MASS INDEX: 34.93 KG/M2 | HEART RATE: 79 BPM

## 2024-10-22 DIAGNOSIS — I48.0 PAROXYSMAL ATRIAL FIBRILLATION (MULTI): Primary | ICD-10-CM

## 2024-10-22 DIAGNOSIS — I51.9 MILD DIASTOLIC DYSFUNCTION: ICD-10-CM

## 2024-10-22 DIAGNOSIS — I10 BENIGN ESSENTIAL HYPERTENSION: ICD-10-CM

## 2024-10-22 PROCEDURE — 1160F RVW MEDS BY RX/DR IN RCRD: CPT | Performed by: INTERNAL MEDICINE

## 2024-10-22 PROCEDURE — 99214 OFFICE O/P EST MOD 30 MIN: CPT | Performed by: INTERNAL MEDICINE

## 2024-10-22 PROCEDURE — 3074F SYST BP LT 130 MM HG: CPT | Performed by: INTERNAL MEDICINE

## 2024-10-22 PROCEDURE — 3079F DIAST BP 80-89 MM HG: CPT | Performed by: INTERNAL MEDICINE

## 2024-10-22 PROCEDURE — 1036F TOBACCO NON-USER: CPT | Performed by: INTERNAL MEDICINE

## 2024-10-22 PROCEDURE — 1159F MED LIST DOCD IN RCRD: CPT | Performed by: INTERNAL MEDICINE

## 2024-10-22 NOTE — PROGRESS NOTES
Chief Complaint:   No chief complaint on file.     History Of Present Illness:    Jennifer Boogie is a 74 y.o. female with a history of paroxysmal atrial fibrillation, diastolic dysfunction, hypertension, and iron deficiency anemia here for establishment of cardiovascular care.    The patient most recently saw Dr. Sequeira in April 2024.  I reviewed that note.    The patient states that her last symptomatic episode of atrial fibrillation was in 2017.  She came to the hospital because of a rapid heartbeat and chest pressure at radiating to left arm.  She says she has never had that symptom again.  She tells me that she is not had any symptoms to suggest atrial fibrillation since that 1 episode.    She does have an iron deficiency anemia which is since resolved.  She had been on anticoagulation when this started.  Anticoagulation was stopped, she received iron infusions, and her hemoglobin is back to normal on recent blood work through the [facility].  At no point did she have obvious blood loss.    She says that she gained about 40 pounds when her thyroid was initially treated.  She has not been able to lose that weight.  Her gait is affected by her weight gain.    She denies any exertional chest pain.  She has to use a cane with walking longer distances because of her gait instability.  She does not have shortness of breath with activities.    Echocardiogram 11/15/2017: EF 65 to 70%.  Left ventricular hypertrophy noted.  Grade 1 diastolic dysfunction.  Trace MR and TR.  RVSP 25 mmHg.    Stress echocardiogram 11/15/2017: Modified Mauro protocol exercising 5 minutes and 3 seconds.  EF 55 to 60% at baseline increasing to 65 to 70% at peak stress.    Ambulatory monitor 11/15/2017: Predominantly sinus rhythm and sinus bradycardia with brief episode of paroxysmal atrial fibrillation.     Past Medical History:  She has a past medical history of A-fib (Multi) and Hypertension.    Past Surgical History:  She has a past  "surgical history that includes Other surgical history (08/03/2020); Other surgical history (08/03/2020); Other surgical history (08/03/2020); Other surgical history (08/03/2020); Other surgical history (08/03/2020); and Other surgical history (08/03/2020).      Social History:  She reports that she has never smoked. She has never used smokeless tobacco. She reports that she does not currently use alcohol. She reports that she does not currently use drugs.    Family History:  No family history on file.     Allergies:  Patient has no known allergies.    Outpatient Medications:  Current Outpatient Medications   Medication Instructions    aspirin 81 mg, oral, Daily    Bystolic 2.5 mg, Daily    cyanocobalamin (VITAMIN B-12) 1,000 mcg, Daily    levothyroxine (SYNTHROID, LEVOXYL) 75 mcg, Daily    lisinopriL-hydrochlorothiazide 20-25 mg tablet 1 tablet, Daily    rosuvastatin (CRESTOR) 20 mg    zolpidem (AMBIEN) 5 mg, Nightly PRN       Last Recorded Vitals:  Visit Vitals  /80 (BP Location: Right arm, Patient Position: Sitting)   Pulse 79   Ht 1.549 m (5' 1\")   Wt 83.9 kg (185 lb)   SpO2 98%   BMI 34.96 kg/m²   Smoking Status Never   BSA 1.9 m²      LASTWT(3):   Wt Readings from Last 3 Encounters:   10/22/24 83.9 kg (185 lb)   03/05/24 86.6 kg (191 lb)   02/15/24 86.2 kg (190 lb)       Physical Exam:  In general: alert and in no acute distress.   HEENT: Carotid upstrokes normal with no bruits. JVP is normal.   Pulmonary: Clear to auscultation bilaterally.  Cardiovascular: S1, S2, regular. No appreciable murmurs, rubs or gallops.   Lower extremities: Warm. 2+ distal pulses. No edema.     Last Labs:  CBC -  Recent Labs     02/15/24  1555 07/26/23  0442 07/25/23  0535   WBC 10.7 CANCELED 15.7*   HGB 7.0* CANCELED 9.8*   HCT 24.1* CANCELED 30.5*    CANCELED 273   MCV 69* CANCELED 89       CMP -  Recent Labs     02/15/24  1555 07/26/23  0442 07/25/23  0535    CANCELED 131*   K 3.8 CANCELED 4.2   CL 99 CANCELED " "99   CO2 24 CANCELED 23   ANIONGAP 18 CANCELED 13   BUN 32* CANCELED 19   CREATININE 0.90 CANCELED 0.66   EGFR 66  --   --    MG 1.66  --   --      Recent Labs     02/15/24  1555 10/18/22  0847   ALBUMIN 4.0 3.9   ALKPHOS 68 62   ALT 12 15   AST 17 17   BILITOT 0.3 0.6       LIPID PANEL -   No results for input(s): \"CHOL\", \"LDLCALC\", \"LDLF\", \"HDL\", \"TRIG\" in the last 87507 hours.  Lipid panel 11/13/2023: Total cholesterol 122, triglycerides 146, HDL 60 and LDL 43.    Recent Labs     10/16/24  1006 06/12/24  0919 02/14/24  0936   HGBA1C 6.8* 6.4* 8.2*           Assessment/Plan   1) paroxysmal atrial fibrillation: Patient not anticoagulated due to her iron deficiency anemia.  At last cardiology visit with Dr. Sequeira there was a discussion about a Watchman device.  She tells me that she had a lot going on at that time and was not interested in additional procedures.    We talked about her elevated [id number] score of 4 for age, gender and hypertension.  I explained that given his elevated score that there is a significant risk for stroke if she has paroxysmal atrial fibrillation and is not on anticoagulation.    Although she has not had another symptomatic episode it is interesting that she wore an ambulatory monitor after her hospitalization which did demonstrate a brief episode of atrial fibrillation although she did not feel this.    I would like to order a 30-day monitor to have a better understanding of the atrial fibrillation burden to assess the need for long-term oral anticoagulation.    If there is no evidence of atrial fibrillation we can consider observation for now.  If there is evidence of atrial fibrillation then I would like the patient to be seen by EP to be evaluated for possibility of a Watchman device.  I think that this would be safer than risking another bleed given her drop of hemoglobin to 7 while last on anticoagulation.  This is especially true because there is no identifiable source of blood " loss.    2) hypertension: Blood pressure control.  Continue with lisinopril/hydrochlorothiazide and Bystolic.    3) diastolic dysfunction: No evidence of volume overload.  Continue to observe.    4) follow-up: 6 months or sooner if needed.      Lyndon Mcdonald MD

## 2024-10-24 ENCOUNTER — APPOINTMENT (OUTPATIENT)
Dept: PHYSICAL THERAPY | Facility: CLINIC | Age: 74
End: 2024-10-24
Payer: MEDICARE

## 2024-10-26 ENCOUNTER — HOSPITAL ENCOUNTER (OUTPATIENT)
Dept: CARDIOLOGY | Facility: HOSPITAL | Age: 74
Discharge: HOME | End: 2024-10-26
Payer: MEDICARE

## 2024-10-26 DIAGNOSIS — I48.0 PAROXYSMAL ATRIAL FIBRILLATION (MULTI): ICD-10-CM

## 2024-10-26 PROCEDURE — 93270 REMOTE 30 DAY ECG REV/REPORT: CPT

## 2024-10-28 ENCOUNTER — TELEPHONE (OUTPATIENT)
Dept: CARDIOLOGY | Facility: CLINIC | Age: 74
End: 2024-10-28
Payer: MEDICARE

## 2024-10-29 ENCOUNTER — EVALUATION (OUTPATIENT)
Dept: PHYSICAL THERAPY | Facility: CLINIC | Age: 74
End: 2024-10-29
Payer: MEDICARE

## 2024-10-29 ENCOUNTER — APPOINTMENT (OUTPATIENT)
Dept: CARDIOLOGY | Facility: HOSPITAL | Age: 74
End: 2024-10-29
Payer: MEDICARE

## 2024-10-29 VITALS — SYSTOLIC BLOOD PRESSURE: 159 MMHG | DIASTOLIC BLOOD PRESSURE: 71 MMHG

## 2024-10-29 DIAGNOSIS — Z74.09 IMPAIRED FUNCTIONAL MOBILITY AND ENDURANCE: Primary | ICD-10-CM

## 2024-10-29 DIAGNOSIS — M48.07 SPINAL STENOSIS, LUMBOSACRAL REGION: ICD-10-CM

## 2024-10-29 DIAGNOSIS — R26.9 IMPAIRED GAIT: ICD-10-CM

## 2024-10-29 DIAGNOSIS — M47.26 OTHER SPONDYLOSIS WITH RADICULOPATHY, LUMBAR REGION: ICD-10-CM

## 2024-10-29 DIAGNOSIS — R26.89 IMPAIRMENT OF BALANCE: ICD-10-CM

## 2024-10-29 DIAGNOSIS — M62.89 PROXIMAL WEAKNESS OF EXTREMITY: ICD-10-CM

## 2024-10-29 PROCEDURE — 97110 THERAPEUTIC EXERCISES: CPT | Mod: GP | Performed by: PHYSICAL THERAPIST

## 2024-10-29 PROCEDURE — 97162 PT EVAL MOD COMPLEX 30 MIN: CPT | Mod: GP | Performed by: PHYSICAL THERAPIST

## 2024-10-29 ASSESSMENT — PATIENT HEALTH QUESTIONNAIRE - PHQ9
2. FEELING DOWN, DEPRESSED OR HOPELESS: NOT AT ALL
1. LITTLE INTEREST OR PLEASURE IN DOING THINGS: NOT AT ALL
SUM OF ALL RESPONSES TO PHQ9 QUESTIONS 1 AND 2: 0

## 2024-10-29 ASSESSMENT — ENCOUNTER SYMPTOMS
OCCASIONAL FEELINGS OF UNSTEADINESS: 1
LOSS OF SENSATION IN FEET: 0
DEPRESSION: 0

## 2024-10-31 ENCOUNTER — TREATMENT (OUTPATIENT)
Dept: PHYSICAL THERAPY | Facility: CLINIC | Age: 74
End: 2024-10-31
Payer: MEDICARE

## 2024-10-31 DIAGNOSIS — R26.9 IMPAIRED GAIT: ICD-10-CM

## 2024-10-31 DIAGNOSIS — Z74.09 IMPAIRED FUNCTIONAL MOBILITY AND ENDURANCE: Primary | ICD-10-CM

## 2024-10-31 DIAGNOSIS — M62.89 PROXIMAL WEAKNESS OF EXTREMITY: ICD-10-CM

## 2024-10-31 DIAGNOSIS — R26.89 IMPAIRMENT OF BALANCE: ICD-10-CM

## 2024-10-31 PROCEDURE — 97110 THERAPEUTIC EXERCISES: CPT | Mod: GP | Performed by: PHYSICAL THERAPIST

## 2024-10-31 PROCEDURE — 97112 NEUROMUSCULAR REEDUCATION: CPT | Mod: GP | Performed by: PHYSICAL THERAPIST

## 2024-11-05 ENCOUNTER — TREATMENT (OUTPATIENT)
Dept: PHYSICAL THERAPY | Facility: CLINIC | Age: 74
End: 2024-11-05
Payer: MEDICARE

## 2024-11-05 DIAGNOSIS — M62.89 PROXIMAL WEAKNESS OF EXTREMITY: ICD-10-CM

## 2024-11-05 DIAGNOSIS — R26.9 IMPAIRED GAIT: ICD-10-CM

## 2024-11-05 DIAGNOSIS — Z74.09 IMPAIRED FUNCTIONAL MOBILITY AND ENDURANCE: Primary | ICD-10-CM

## 2024-11-05 DIAGNOSIS — R26.89 IMPAIRMENT OF BALANCE: ICD-10-CM

## 2024-11-05 PROCEDURE — 97110 THERAPEUTIC EXERCISES: CPT | Mod: GP | Performed by: PHYSICAL THERAPIST

## 2024-11-05 PROCEDURE — 97112 NEUROMUSCULAR REEDUCATION: CPT | Mod: GP | Performed by: PHYSICAL THERAPIST

## 2024-11-05 NOTE — PROGRESS NOTES
Physical Therapy    Physical Therapy Treatment    Patient Name: Jennifer Boogie  MRN: [id number]  Today's Date: 11/5/2024    Time Entry:   Time Calculation  Start Time: 1002  Stop Time: 1045  Time Calculation (min): 43 min     PT Therapeutic Procedures Time Entry  Neuromuscular Re-Education Time Entry: 31  Therapeutic Exercise Time Entry: 8                   Insurance: [facility] A/B 90D  -[employer] supplemental      Visit Number: 3 (2/10)  -Authorized Visits: 10 (1-2x/week)  -Authorized Date Range: 10/29/2024 - 1/27/2024    Assessment:   Session focused on progressing functional endurance and balance in the context of active weight shifting, SLS control, and manipulation of balance systems. Patient tolerated very well and demonstrated improvements in active weight shift as evidenced by improved control w/ brief periods of SLS. Improved vestibular functioning observed during static balance training as evidenced by ability to perform eyes closed on Airex for 4s w/ min - mod sway vs 1s on IE. Still w/ impairments in endurance, gait, and SLS control. At this time, patient continues to require skilled PT services to address ongoing impairments in functional activity tolerance, neuromuscular control, SLS stability, and gait mechanics to reduce fall risk and promote return to baseline function.    Plan:   Continue with POC.     Next Visit:   -Functional Strength/Endurance: Blazepods, circuit training, walking trials during session, hip ABD exercises, core exercises  -Balance: vestibular challenges, somatosensory challenges, Blazepods   -Gait mechanics: heel strike, knee flexion (can use Blazepods for this w/ tapping up to step)    Current Problem  1. Impaired functional mobility and endurance        2. Proximal weakness of extremity        3. Impairment of balance        4. Impaired gait            Subjective    General  Patient presents to the clinic ambulatory w/ SPC and w/ no new reports from last session. Did not feel overly sore  "after last session. States compliance w/ HEP and is w/out questions to concerns.       Objective     Treatments:  Therapeutic Exercise (06151): 8 minutes  To improve LE strength and endurance:   -Ambulation around clinic w/out device at slightly brisker pace, x 6 min   -Education regarding rationale behind interventions performed and transitioning early PT focus to balance w/ aspects of endurance training based on observations. Patient verbalized understanding and in agreement w/ plan.    Neuromuscular Re-Education (19930): 31 minutes  To improve SLS stability, weight shift, and ankle strategies:  -Blazepods:    -Placed ANT on 6\" riser and LAT on ground (4 pods) w/ pt tapping toe up to pods, x 1 min, x 2 min    -Placed ANT to patient on ground (4 pods) w/ pt tapping heel up to pods, x 2 min    -Placed ANT on elevated mat table ANT to patient w/ patient on Airex (4 pods), reaching outside JADYN, x 2 min    To improve righting reactions and efficiency of balance systems:   -NBOS on Airex, horizontal head turns, 2 x 30s - mod sway  -NBOS on Airex, vertical head nods, 2 x 30s - mod sway  -NBOS on Airex, EC blinks, 10 x 4s blinks - min to mod sway    Current HEP:  Access Code: [email or web address]  URL: [email or web address]  Date: 10/29/2024  Prepared by: Leia Guadalupe     Exercises  - Standing Forward Step Taps with Counter Support  - 1 x daily - 7 x weekly - 3 sets - 45s hold  - Side Stepping with Counter Support  - 1 x daily - 7 x weekly - 3 sets - 1 min hold  - Romberg Stance with Eyes Closed  - 1 x daily - 7 x weekly - 3 sets - 15s hold  - Romberg Stance with Head Rotation  - 1 x daily - 7 x weekly - 3 sets - 30s hold  - Romberg Stance with Head Nods  - 1 x daily - 7 x weekly - 3 sets - 30s hold    OP EDUCATION:   Provided education on the rational behind interventions performed in the context of ongoing symptoms. Patient verbalized understanding.    Goals:  By discharge,      1.) Patient will demonstrate " independence with HEP to promote symptom relief and facilitate return to prior level of function.  2.) Score on ABC Balance Scale will improve to >67% to indicate reduced fall risk and improved confidence related to balance tasks (Scores <67% indicate risk of falling in older adults).  3.) Time on 5xSTS will improve to <12s w/out instability upon standing to indicate improved functional LE strength and decreased risk of falls (Times >/= 12s = further need to assess fall risk in community dwelling older adults).   4.) Distance on 6MWT will improve by 61m w/ more normalized BP response (</= 20/10 mmHg change) to indicate improvements in endurance and cardiovascular response.   5.) Patient will demonstrate ability to complete all trials of mCTSIB w/ up to mod instability and for total duration of 30s each to indicate improvements in static balance and righting strategies and to reduce overall fall risk.  6.) Patient will demonstrate adequate ankle, hip, and reactive stepping strategies to reduce fall risk and promote safety of mobility tasks.  7.) Patient will demonstrate ability to ambulate >500ft w/out device and w/ more normalized mechanics, including heel strike at IC and reduced lateral trunk lean during ipsilateral stance, to promote return to baseline function and improve safety of ambulatory tasks.  8.) Patient will report ability to negotiate stairs w/ unilateral UE support and <minimal subjective instability to promote return to baseline function.   9.) Patient will report ability to negotiate curbs w/out UE support and < minimal subjective instability to promote return to baseline function and improve ability to negotiate community environments.   10.) Patient will report ability to attend grandchildrens' sporting events w/ LRAD and < minimal subjective instability to promote return to community participation.

## 2024-11-07 ENCOUNTER — TREATMENT (OUTPATIENT)
Dept: PHYSICAL THERAPY | Facility: CLINIC | Age: 74
End: 2024-11-07
Payer: MEDICARE

## 2024-11-07 DIAGNOSIS — R26.9 IMPAIRED GAIT: ICD-10-CM

## 2024-11-07 DIAGNOSIS — R26.89 IMPAIRMENT OF BALANCE: ICD-10-CM

## 2024-11-07 DIAGNOSIS — Z74.09 IMPAIRED FUNCTIONAL MOBILITY AND ENDURANCE: Primary | ICD-10-CM

## 2024-11-07 DIAGNOSIS — M62.89 PROXIMAL WEAKNESS OF EXTREMITY: ICD-10-CM

## 2024-11-07 PROCEDURE — 97112 NEUROMUSCULAR REEDUCATION: CPT | Mod: GP | Performed by: PHYSICAL THERAPIST

## 2024-11-07 PROCEDURE — 97110 THERAPEUTIC EXERCISES: CPT | Mod: GP | Performed by: PHYSICAL THERAPIST

## 2024-11-07 NOTE — PROGRESS NOTES
Physical Therapy    Physical Therapy Treatment    Patient Name: Jennifer Boogie  MRN: [id number]  Today's Date: 11/7/2024    Time Entry:   Time Calculation  Start Time: 1048  Stop Time: 1130  Time Calculation (min): 42 min     PT Therapeutic Procedures Time Entry  Neuromuscular Re-Education Time Entry: 23  Therapeutic Exercise Time Entry: 15                   Insurance: [facility] A/B 90D  -AAR supplemental      Visit Number: 4 (3/10)  -Authorized Visits: 10 (1-2x/week)  -Authorized Date Range: 10/29/2024 - 1/27/2024    Assessment:   Session focused on progressing functional endurance and balance in the context of active weight shifting, SLS control, and manipulation of balance systems. Also integrated LE and core strengthening to assist in improving overall stability. Patient tolerated very well w/ up to moderate instability which was to be expected based on level of challenge performed. Still challenged w/ periods of SLS which appears to be due at least in part to lateral hip and core weakness. However, was able to perform balance under narrow base of support and eyes closed conditions for up to 10 seconds w/ no more than min sway which indicates improvements in vestibular functioning. Overall, patient appears to be making positive progress towards goals. At this time, patient continues to require skilled PT services to address ongoing impairments in functional activity tolerance, neuromuscular control, SLS stability, and gait mechanics to reduce fall risk and promote return to baseline function.    Plan:   Continue with POC.     Next Visit:   -Functional Strength/Endurance: Blazepods, circuit training, walking trials during session, hip ABD exercises, core exercises  -Balance: vestibular challenges, somatosensory challenges, Blazepods   -Gait mechanics: heel strike, knee flexion (can use Blazepods for this w/ tapping up to step)    Current Problem  1. Impaired functional mobility and endurance        2. Proximal weakness  "of extremity        3. Impairment of balance        4. Impaired gait            Subjective    General  Patient presents to the clinic ambulatory w/ SPC and w/ no new reports from last session. States she is beginning to feel more confident w/ tasks at home. States compliance w/ HEP and is w/out questions to concerns.       Objective     Treatments:  Therapeutic Exercise (92016): 15 minutes  To improve LE and core strength/endurance:   -Ambulation around clinic w/out device at slightly brisker pace, x 6 min w/ appropriate rest breaks afterwards prior to initiating balance training  -LAT step-ups to 4\" step w/ B UE support, 2 x 10 R/L - more challenging on L LE  -Standing paloff press w/ red TB, x 12 R/L     Neuromuscular Re-Education (77039): 23 minutes  To improve SLS stability, weight shift, and ankle strategies:  -Modified SLS w/ contralateral LE on blue dynadisc placed on 4\" step, 2 x 30s R/L - 1 fingertip support     To improve righting reactions and efficiency of balance systems:   -NBOS on Airex, horizontal head turns, 2 x 30s - min - mod sway  -NBOS on Airex, vertical head nods, 2 x 30s - min sway  -NBOS on Airex, EC blinks, 8 x 10s blinks - min sway    Current HEP:  Access Code: [email or web address]  URL: [email or web address]  Date: 10/29/2024  Prepared by: Leia Guadalupe     Exercises  - Standing Forward Step Taps with Counter Support  - 1 x daily - 7 x weekly - 3 sets - 45s hold  - Side Stepping with Counter Support  - 1 x daily - 7 x weekly - 3 sets - 1 min hold  - Romberg Stance with Eyes Closed  - 1 x daily - 7 x weekly - 3 sets - 15s hold  - Romberg Stance with Head Rotation  - 1 x daily - 7 x weekly - 3 sets - 30s hold  - Romberg Stance with Head Nods  - 1 x daily - 7 x weekly - 3 sets - 30s hold    OP EDUCATION:   Provided education on the rational behind interventions performed in the context of ongoing symptoms. Patient verbalized understanding.    Goals:  By discharge,      1.) Patient " will demonstrate independence with HEP to promote symptom relief and facilitate return to prior level of function.  2.) Score on ABC Balance Scale will improve to >67% to indicate reduced fall risk and improved confidence related to balance tasks (Scores <67% indicate risk of falling in older adults).  3.) Time on 5xSTS will improve to <12s w/out instability upon standing to indicate improved functional LE strength and decreased risk of falls (Times >/= 12s = further need to assess fall risk in community dwelling older adults).   4.) Distance on 6MWT will improve by 61m w/ more normalized BP response (</= 20/10 mmHg change) to indicate improvements in endurance and cardiovascular response.   5.) Patient will demonstrate ability to complete all trials of mCTSIB w/ up to mod instability and for total duration of 30s each to indicate improvements in static balance and righting strategies and to reduce overall fall risk.  6.) Patient will demonstrate adequate ankle, hip, and reactive stepping strategies to reduce fall risk and promote safety of mobility tasks.  7.) Patient will demonstrate ability to ambulate >500ft w/out device and w/ more normalized mechanics, including heel strike at IC and reduced lateral trunk lean during ipsilateral stance, to promote return to baseline function and improve safety of ambulatory tasks.  8.) Patient will report ability to negotiate stairs w/ unilateral UE support and <minimal subjective instability to promote return to baseline function.   9.) Patient will report ability to negotiate curbs w/out UE support and < minimal subjective instability to promote return to baseline function and improve ability to negotiate community environments.   10.) Patient will report ability to attend grandchildrens' sporting events w/ LRAD and < minimal subjective instability to promote return to community participation.

## 2024-11-12 ENCOUNTER — TREATMENT (OUTPATIENT)
Dept: PHYSICAL THERAPY | Facility: CLINIC | Age: 74
End: 2024-11-12
Payer: MEDICARE

## 2024-11-12 DIAGNOSIS — R26.9 IMPAIRED GAIT: ICD-10-CM

## 2024-11-12 DIAGNOSIS — R26.89 IMPAIRMENT OF BALANCE: ICD-10-CM

## 2024-11-12 DIAGNOSIS — Z74.09 IMPAIRED FUNCTIONAL MOBILITY AND ENDURANCE: Primary | ICD-10-CM

## 2024-11-12 DIAGNOSIS — M62.89 PROXIMAL WEAKNESS OF EXTREMITY: ICD-10-CM

## 2024-11-12 PROCEDURE — 97110 THERAPEUTIC EXERCISES: CPT | Mod: GP | Performed by: PHYSICAL THERAPIST

## 2024-11-12 PROCEDURE — 97112 NEUROMUSCULAR REEDUCATION: CPT | Mod: GP | Performed by: PHYSICAL THERAPIST

## 2024-11-12 NOTE — PROGRESS NOTES
Physical Therapy    Physical Therapy Treatment    Patient Name: Jennifer Boogie  MRN: [id number]  Today's Date: 11/12/2024    Time Entry:   Time Calculation  Start Time: 1002  Stop Time: 1045  Time Calculation (min): 43 min     PT Therapeutic Procedures Time Entry  Neuromuscular Re-Education Time Entry: 10  Therapeutic Exercise Time Entry: 30                   Insurance: [facility] A/B 90D  -AARP supplemental      Visit Number: 5 (4/10)  -Authorized Visits: 10 (1-2x/week)  -Authorized Date Range: 10/29/2024 - 1/27/2024    Assessment:   Session focused on progressing functional endurance and balance in the context of active weight shifting, SLS control, and manipulation of balance systems. Also integrated LE and core strengthening to assist in improving overall stability. Patient tolerated well w/ ongoing fatigue noted during initial ambulatory attempts. Able to initiate heavier strengthening this session w/out issue. Balance is still challenging. Attempted to utilize heel lift to improve gait mechanics, but patient stated that she has tried this in the past and hasn't noticed a difference. At this time, patient continues to require skilled PT services to address ongoing impairments in functional activity tolerance, neuromuscular control, SLS stability, and gait mechanics to reduce fall risk and promote return to baseline function.    Plan:   Continue with POC.     Next Visit:   -Functional Strength/Endurance: Blazepods, circuit training, walking trials during session, hip ABD exercises, core exercises  -Balance: vestibular challenges, somatosensory challenges, Blazepods   -Gait mechanics: heel strike, knee flexion (can use Blazepods for this w/ tapping up to step)    Current Problem  1. Impaired functional mobility and endurance        2. Proximal weakness of extremity        3. Impairment of balance        4. Impaired gait            Subjective    General  Patient presents to the clinic ambulatory w/ SPC and w/ reports of  "being able to do stairs at home w/ heavy B UE support. States that R LE is able to perform more step-ups than L LE. Is up to walking for 8 minutes at home. States compliance w/ HEP and is w/out questions to concerns.       Objective     Treatments:  Therapeutic Exercise (08641): 30 minutes  To improve LE and core strength/endurance:   -Ambulation around clinic w/out device at slightly brisker pace, x 6 min   -DL Cybex leg press, 2 x 12 (70#)  -LAT walking w/ yellow TB in // bars, x 2 min  -Monster walks w/ yellow TB in // bars, x 2:30 min FWD <> BWD     Neuromuscular Re-Education (80961): 10 minutes  To improve SLS stability, weight shift, and ankle strategies:  -Modified SLS w/ contralateral LE on blue dynadisc placed on 4\" step, 2 x 30s R/L - 1 fingertip support     To improve righting reactions and efficiency of balance systems:   -NBOS on Airex, horizontal head turns, x 30s - min - mod sway  -NBOS on Airex, vertical head nods, x 30s - min sway  -NBOS on Airex, EC blinks, x 30s - min sway    Current HEP:  Access Code: [email or web address]  URL: [email or web address]  Date: 10/29/2024  Prepared by: Leia Guadalupe     Exercises  - Standing Forward Step Taps with Counter Support  - 1 x daily - 7 x weekly - 3 sets - 45s hold  - Side Stepping with Counter Support  - 1 x daily - 7 x weekly - 3 sets - 1 min hold  - Romberg Stance with Eyes Closed  - 1 x daily - 7 x weekly - 3 sets - 15s hold  - Romberg Stance with Head Rotation  - 1 x daily - 7 x weekly - 3 sets - 30s hold  - Romberg Stance with Head Nods  - 1 x daily - 7 x weekly - 3 sets - 30s hold    OP EDUCATION:   Provided education on the rational behind interventions performed in the context of ongoing symptoms. Patient verbalized understanding.    Goals:  By discharge,      1.) Patient will demonstrate independence with HEP to promote symptom relief and facilitate return to prior level of function.  2.) Score on ABC Balance Scale will improve to >67% " to indicate reduced fall risk and improved confidence related to balance tasks (Scores <67% indicate risk of falling in older adults).  3.) Time on 5xSTS will improve to <12s w/out instability upon standing to indicate improved functional LE strength and decreased risk of falls (Times >/= 12s = further need to assess fall risk in community dwelling older adults).   4.) Distance on 6MWT will improve by 61m w/ more normalized BP response (</= 20/10 mmHg change) to indicate improvements in endurance and cardiovascular response.   5.) Patient will demonstrate ability to complete all trials of mCTSIB w/ up to mod instability and for total duration of 30s each to indicate improvements in static balance and righting strategies and to reduce overall fall risk.  6.) Patient will demonstrate adequate ankle, hip, and reactive stepping strategies to reduce fall risk and promote safety of mobility tasks.  7.) Patient will demonstrate ability to ambulate >500ft w/out device and w/ more normalized mechanics, including heel strike at IC and reduced lateral trunk lean during ipsilateral stance, to promote return to baseline function and improve safety of ambulatory tasks.  8.) Patient will report ability to negotiate stairs w/ unilateral UE support and <minimal subjective instability to promote return to baseline function.   9.) Patient will report ability to negotiate curbs w/out UE support and < minimal subjective instability to promote return to baseline function and improve ability to negotiate community environments.   10.) Patient will report ability to attend grandchildrens' sporting events w/ LRAD and < minimal subjective instability to promote return to community participation.

## 2024-11-13 NOTE — PROGRESS NOTES
Physical Therapy    Physical Therapy Treatment    Patient Name: Jennifer Boogie  MRN: [id number]  Today's Date: 11/14/2024    Time Entry:   Time Calculation  Start Time: 1003  Stop Time: 1045  Time Calculation (min): 42 min     PT Therapeutic Procedures Time Entry  Neuromuscular Re-Education Time Entry: 12  Therapeutic Exercise Time Entry: 28                   Insurance: [facility] A/B 90D  -AAR supplemental      Visit Number: 6 (5/10)  -Authorized Visits: 10 (1-2x/week)  -Authorized Date Range: 10/29/2024 - 1/27/2024    Assessment:   Session focused on progressing functional endurance and balance in the context of active weight shifting, SLS control, and manipulation of balance systems. Also integrated LE and core strengthening to assist in improving overall stability. Patient tolerated well and was able to progress challenge of strengthening interventions indicating progress in LE strength. Also able to progress balance interventions into more challenging positions w/ minimal effect on overall stability. Overall, patient appears to be progressing towards goals, but is still limited by endurance and SL stance control. At this time, patient continues to require skilled PT services to address ongoing impairments in functional activity tolerance, neuromuscular control, SLS stability, and gait mechanics to reduce fall risk and promote return to baseline function.    Plan:   Continue with POC.     Next Visit:   -Functional Strength/Endurance: Blazepods, circuit training, walking trials during session, hip ABD exercises, core exercises  -Balance: vestibular challenges, somatosensory challenges, Blazepods   -Gait mechanics: heel strike, knee flexion (can use Blazepods for this w/ tapping up to step)    Current Problem  1. Impaired functional mobility and endurance        2. Proximal weakness of extremity        3. Impairment of balance        4. Impaired gait            Subjective    General  Patient presents to the clinic  "ambulatory w/ SPC and w/ no new updates from last session. States compliance w/ HEP and is w/out questions to concerns.       Objective     Treatments:  Therapeutic Exercise (89805): 28 minutes  To improve LE and core strength/endurance:   -Cable column walk-outs (10#), x 10 FWD   -DL Cybex leg press, x 12 (70#), 2 x 12 (80#)  -Furniture slider mini squats, x 10 R/L FWD, x 10 R/L LAT    Neuromuscular Re-Education (98247): 12 minutes  To improve SLS stability, weight shift, and ankle strategies:  -Modified SLS w/ contralateral LE on blue dynadisc placed on 4\" step, 2 x 30s R/L - 1 fingertip support on 1st set, progressed to no hands on 2nd set w/ success     To improve righting reactions and efficiency of balance systems:   -NBOS on Airex, horizontal head turns, x 30s - min sway  -1/2 tandem on Airex, vertical head nods, x 30s R/L - min sway  -1/2 tandem on Airex, EC blinks, x 30s R/L - min to mod sway    Current HEP:  Access Code: [email or web address]  URL: [email or web address]  Date: 10/29/2024  Prepared by: Leia Guadalupe     Exercises  - Standing Forward Step Taps with Counter Support  - 1 x daily - 7 x weekly - 3 sets - 45s hold  - Side Stepping with Counter Support  - 1 x daily - 7 x weekly - 3 sets - 1 min hold  - Romberg Stance with Eyes Closed  - 1 x daily - 7 x weekly - 3 sets - 15s hold  - Romberg Stance with Head Rotation  - 1 x daily - 7 x weekly - 3 sets - 30s hold  - Romberg Stance with Head Nods  - 1 x daily - 7 x weekly - 3 sets - 30s hold    OP EDUCATION:   Provided education on the rational behind interventions performed in the context of ongoing symptoms. Patient verbalized understanding.    Goals:  By discharge,      1.) Patient will demonstrate independence with HEP to promote symptom relief and facilitate return to prior level of function.  2.) Score on ABC Balance Scale will improve to >67% to indicate reduced fall risk and improved confidence related to balance tasks (Scores <67% " indicate risk of falling in older adults).  3.) Time on 5xSTS will improve to <12s w/out instability upon standing to indicate improved functional LE strength and decreased risk of falls (Times >/= 12s = further need to assess fall risk in community dwelling older adults).   4.) Distance on 6MWT will improve by 61m w/ more normalized BP response (</= 20/10 mmHg change) to indicate improvements in endurance and cardiovascular response.   5.) Patient will demonstrate ability to complete all trials of mCTSIB w/ up to mod instability and for total duration of 30s each to indicate improvements in static balance and righting strategies and to reduce overall fall risk.  6.) Patient will demonstrate adequate ankle, hip, and reactive stepping strategies to reduce fall risk and promote safety of mobility tasks.  7.) Patient will demonstrate ability to ambulate >500ft w/out device and w/ more normalized mechanics, including heel strike at IC and reduced lateral trunk lean during ipsilateral stance, to promote return to baseline function and improve safety of ambulatory tasks.  8.) Patient will report ability to negotiate stairs w/ unilateral UE support and <minimal subjective instability to promote return to baseline function.   9.) Patient will report ability to negotiate curbs w/out UE support and < minimal subjective instability to promote return to baseline function and improve ability to negotiate community environments.   10.) Patient will report ability to attend grandchildrens' sporting events w/ LRAD and < minimal subjective instability to promote return to community participation.

## 2024-11-14 ENCOUNTER — TREATMENT (OUTPATIENT)
Dept: PHYSICAL THERAPY | Facility: CLINIC | Age: 74
End: 2024-11-14
Payer: MEDICARE

## 2024-11-14 DIAGNOSIS — R26.9 IMPAIRED GAIT: ICD-10-CM

## 2024-11-14 DIAGNOSIS — R26.89 IMPAIRMENT OF BALANCE: ICD-10-CM

## 2024-11-14 DIAGNOSIS — Z74.09 IMPAIRED FUNCTIONAL MOBILITY AND ENDURANCE: Primary | ICD-10-CM

## 2024-11-14 DIAGNOSIS — M62.89 PROXIMAL WEAKNESS OF EXTREMITY: ICD-10-CM

## 2024-11-14 PROCEDURE — 97112 NEUROMUSCULAR REEDUCATION: CPT | Mod: GP | Performed by: PHYSICAL THERAPIST

## 2024-11-14 PROCEDURE — 97110 THERAPEUTIC EXERCISES: CPT | Mod: GP | Performed by: PHYSICAL THERAPIST

## 2024-11-19 ENCOUNTER — TREATMENT (OUTPATIENT)
Dept: PHYSICAL THERAPY | Facility: CLINIC | Age: 74
End: 2024-11-19
Payer: MEDICARE

## 2024-11-19 DIAGNOSIS — R26.9 IMPAIRED GAIT: ICD-10-CM

## 2024-11-19 DIAGNOSIS — R26.89 IMPAIRMENT OF BALANCE: ICD-10-CM

## 2024-11-19 DIAGNOSIS — M62.89 PROXIMAL WEAKNESS OF EXTREMITY: ICD-10-CM

## 2024-11-19 DIAGNOSIS — Z74.09 IMPAIRED FUNCTIONAL MOBILITY AND ENDURANCE: Primary | ICD-10-CM

## 2024-11-19 PROCEDURE — 97112 NEUROMUSCULAR REEDUCATION: CPT | Mod: GP | Performed by: PHYSICAL THERAPIST

## 2024-11-19 NOTE — PROGRESS NOTES
Physical Therapy    Physical Therapy Treatment    Patient Name: Jennifer Boogie  MRN: [id number]  Today's Date: 11/19/2024    Time Entry:   Time Calculation  Start Time: 1001  Stop Time: 1030  Time Calculation (min): 29 min     PT Therapeutic Procedures Time Entry  Neuromuscular Re-Education Time Entry: 23  Therapeutic Exercise Time Entry: 5                   Insurance: [facility] A/B 90D  -AARP supplemental      Visit Number: 7 (6/10)  -Authorized Visits: 10 (1-2x/week)  -Authorized Date Range: 10/29/2024 - 1/27/2024    Assessment:   Session focused on progressing functional endurance and balance in the context of active weight shifting, SLS control, and manipulation of balance systems. Patient tolerated well and continues to demonstrate improvements in righting reactions under various conditions as well as active weight shifting. Appropriate weight shifting is easier to perform w/ R LE stance vs L, however there are periods where L LE stance appears very controlled. Overall, patient is progressing towards goals. Still challenged by endurance and requiring frequent rest breaks during ambulation at beginning of sessions. At this time, patient continues to require skilled PT services to address ongoing impairments in functional activity tolerance, neuromuscular control, SLS stability, and gait mechanics to reduce fall risk and promote return to baseline function.    Plan:   Continue with POC.     Next Visit:   -Begin with NuStep  -Functional Strength/Endurance: Blazepods, circuit training, walking trials during session, hip ABD exercises, core exercises  -Balance: vestibular challenges, somatosensory challenges, Blazepods   -Gait mechanics: heel strike, knee flexion (can use Blazepods for this w/ tapping up to step)    Current Problem  1. Impaired functional mobility and endurance        2. Proximal weakness of extremity        3. Impairment of balance        4. Impaired gait            Subjective    General  Patient presents  "to the clinic ambulatory w/ SPC and w/ no new updates from last session. States compliance w/ HEP and is w/out questions to concerns.       Objective     Treatments:  Therapeutic Exercise (37151): 5 minutes  To improve LE and core strength/endurance:   -Ambulation around clinic w/out device, x 5 min     Neuromuscular Re-Education (37175): 23 minutes  To improve SLS stability, weight shift, and ankle strategies:  -Modified SLS w/ contralateral LE on blue dynadisc placed on 4\" step, 2 x 30s R/L - 1 fingertip support on 1st set, progressed to no hands on 2nd set w/ success   -Blazepods:    -LE taps up to 2\" box (3 pods) on flat ground, random, x 1 min    -LE taps up to 4\" box (3 pods) on flat ground, random, x 2 min - appropriate weight shift on 50% of trials   -Heel taps to pods placed on flat ground (3 pods), random, x 2 min  -1/2 tandem on Airex, EC, x 30s R/L - min to mod sway     Current HEP:  Access Code: [email or web address]  URL: [email or web address]  Date: 10/29/2024  Prepared by: Leia Guadalupe     Exercises  - Standing Forward Step Taps with Counter Support  - 1 x daily - 7 x weekly - 3 sets - 45s hold  - Side Stepping with Counter Support  - 1 x daily - 7 x weekly - 3 sets - 1 min hold  - Romberg Stance with Eyes Closed  - 1 x daily - 7 x weekly - 3 sets - 15s hold  - Romberg Stance with Head Rotation  - 1 x daily - 7 x weekly - 3 sets - 30s hold  - Romberg Stance with Head Nods  - 1 x daily - 7 x weekly - 3 sets - 30s hold    OP EDUCATION:   Provided education on the rational behind interventions performed in the context of ongoing symptoms. Patient verbalized understanding.    Goals:  By discharge,      1.) Patient will demonstrate independence with HEP to promote symptom relief and facilitate return to prior level of function.  2.) Score on ABC Balance Scale will improve to >67% to indicate reduced fall risk and improved confidence related to balance tasks (Scores <67% indicate risk of falling " in older adults).  3.) Time on 5xSTS will improve to <12s w/out instability upon standing to indicate improved functional LE strength and decreased risk of falls (Times >/= 12s = further need to assess fall risk in community dwelling older adults).   4.) Distance on 6MWT will improve by 61m w/ more normalized BP response (</= 20/10 mmHg change) to indicate improvements in endurance and cardiovascular response.   5.) Patient will demonstrate ability to complete all trials of mCTSIB w/ up to mod instability and for total duration of 30s each to indicate improvements in static balance and righting strategies and to reduce overall fall risk.  6.) Patient will demonstrate adequate ankle, hip, and reactive stepping strategies to reduce fall risk and promote safety of mobility tasks.  7.) Patient will demonstrate ability to ambulate >500ft w/out device and w/ more normalized mechanics, including heel strike at IC and reduced lateral trunk lean during ipsilateral stance, to promote return to baseline function and improve safety of ambulatory tasks.  8.) Patient will report ability to negotiate stairs w/ unilateral UE support and <minimal subjective instability to promote return to baseline function.   9.) Patient will report ability to negotiate curbs w/out UE support and < minimal subjective instability to promote return to baseline function and improve ability to negotiate community environments.   10.) Patient will report ability to attend grandchildrens' sporting events w/ LRAD and < minimal subjective instability to promote return to community participation.

## 2024-11-27 ENCOUNTER — APPOINTMENT (OUTPATIENT)
Dept: PHYSICAL THERAPY | Facility: CLINIC | Age: 74
End: 2024-11-27
Payer: MEDICARE

## 2024-11-27 ENCOUNTER — TELEPHONE (OUTPATIENT)
Dept: CARDIOLOGY | Facility: CLINIC | Age: 74
End: 2024-11-27
Payer: MEDICARE

## 2024-11-27 DIAGNOSIS — R26.89 IMPAIRMENT OF BALANCE: ICD-10-CM

## 2024-11-27 DIAGNOSIS — R26.9 IMPAIRED GAIT: ICD-10-CM

## 2024-11-27 DIAGNOSIS — Z74.09 IMPAIRED FUNCTIONAL MOBILITY AND ENDURANCE: Primary | ICD-10-CM

## 2024-11-27 DIAGNOSIS — M62.89 PROXIMAL WEAKNESS OF EXTREMITY: ICD-10-CM

## 2024-11-27 NOTE — TELEPHONE ENCOUNTER
----- Message from Lyndon Mcdonald sent at 11/27/2024  3:37 PM EST -----  Please let the patient know that her monitor looked good.  There is no evidence of atrial fibrillation.  This is great news.  No changes needed.  Follow-up as scheduled.

## 2024-11-27 NOTE — TELEPHONE ENCOUNTER
Result Communication    Resulted Orders   Holter Or Event Cardiac Monitor    Narrative    Abdominally sinus rhythm with average heart rate 72 bpm ( bpm).    Rare PACs and PVCs.    Overall benign ambulatory monitor.         3:53 PM      Results were successfully communicated with the patient and they acknowledged their understanding.

## 2024-12-06 ENCOUNTER — TREATMENT (OUTPATIENT)
Dept: PHYSICAL THERAPY | Facility: CLINIC | Age: 74
End: 2024-12-06
Payer: MEDICARE

## 2024-12-06 DIAGNOSIS — M62.89 PROXIMAL WEAKNESS OF EXTREMITY: ICD-10-CM

## 2024-12-06 DIAGNOSIS — R26.9 IMPAIRED GAIT: ICD-10-CM

## 2024-12-06 DIAGNOSIS — R26.89 IMPAIRMENT OF BALANCE: ICD-10-CM

## 2024-12-06 DIAGNOSIS — Z74.09 IMPAIRED FUNCTIONAL MOBILITY AND ENDURANCE: Primary | ICD-10-CM

## 2024-12-06 PROCEDURE — 97112 NEUROMUSCULAR REEDUCATION: CPT | Mod: GP | Performed by: PHYSICAL THERAPIST

## 2024-12-06 PROCEDURE — 97110 THERAPEUTIC EXERCISES: CPT | Mod: GP | Performed by: PHYSICAL THERAPIST

## 2024-12-06 NOTE — PROGRESS NOTES
Physical Therapy    Physical Therapy Treatment    Patient Name: Jennifer Boogie  MRN: [id number]  Today's Date: 12/6/2024    Time Entry:   Time Calculation  Start Time: 0900  Stop Time: 0945  Time Calculation (min): 45 min     PT Therapeutic Procedures Time Entry  Neuromuscular Re-Education Time Entry: 32  Therapeutic Exercise Time Entry: 8                   Insurance: [facility] A/B 90D  -AARP supplemental      Visit Number: 8 (7/10)  -Authorized Visits: 10 (1-2x/week)  -Authorized Date Range: 10/29/2024 - 1/27/2024    Assessment:   Session focused on progressing functional endurance and balance in the context of active weight shifting, SLS control, and manipulation of balance systems. Patient tolerated session very well w/ best balance performance to date w/ much improved active weight and LE stance control. Interestingly, patient's performance standing on L LE appeared more controlled than R which was previously not the case. Even able to progress onto compliant surface and initiate curb step negotiation w/ weight shift and single-leg control on majority of repetitions. Still challenged by endurance and requiring frequent rest breaks during ambulation at beginning of sessions. At this time, patient continues to require skilled PT services to address ongoing impairments in functional activity tolerance, neuromuscular control, SLS stability, and gait mechanics to reduce fall risk and promote return to baseline function.    Plan:   Continue with POC.     Next Visit:   -Begin with NuStep  -Functional Strength/Endurance: Blazepods, circuit training, walking trials during session, hip ABD exercises, core exercises  -Balance: vestibular challenges, somatosensory challenges, Blazepods   -Gait mechanics: heel strike, knee flexion (can use Blazepods for this w/ tapping up to step)    Current Problem  1. Impaired functional mobility and endurance        2. Proximal weakness of extremity        3. Impairment of balance        4.  "Impaired gait            Subjective    General  Patient presents to the clinic ambulatory w/ SPC and w/ no new updates from last session. States compliance w/ HEP and is w/out questions to concerns.       Objective     Treatments:  Therapeutic Exercise (94338): 8 minutes  To improve LE and core strength/endurance:   -NuStep, x 8 min, lvl 4-6    Neuromuscular Re-Education (11220): 32 minutes  To improve SLS stability, weight shift, and ankle strategies:  -Blazepods:    -ANT LE taps up to 4\" box (4 pods) on flat ground, random, x 2 min   -LAT LE taps up to 4\" box (3 pods) on flat ground, random, x 1:30 min R/L - 75% appropriate weight shift on L LE, 50% appropriate weight shift on R LE   -ANT taps up to 2\" box (3 pods) while standing on Airex, x 2 min    To improve curb negotiation and balance during functional task:   -FWD step-downs from 2\" box w/out UE support, x 10 R/L  -FWD step-up to 2\" box w/out UE support, x 10 R/L    Current HEP:  Access Code: [email or web address]  [facility]: [email or web address]  Date: 10/29/2024  Prepared by: Leia Guadalupe     Exercises  - Standing Forward Step Taps with Counter Support  - 1 x daily - 7 x weekly - 3 sets - 45s hold  - Side Stepping with Counter Support  - 1 x daily - 7 x weekly - 3 sets - 1 min hold  - Romberg Stance with Eyes Closed  - 1 x daily - 7 x weekly - 3 sets - 15s hold  - Romberg Stance with Head Rotation  - 1 x daily - 7 x weekly - 3 sets - 30s hold  - Romberg Stance with Head Nods  - 1 x daily - 7 x weekly - 3 sets - 30s hold    OP EDUCATION:   Provided education on the rational behind interventions performed in the context of ongoing symptoms. Patient verbalized understanding.    Goals:  By discharge,      1.) Patient will demonstrate independence with HEP to promote symptom relief and facilitate return to prior level of function.  2.) Score on ABC Balance Scale will improve to >67% to indicate reduced fall risk and improved confidence related to balance " tasks (Scores <67% indicate risk of falling in older adults).  3.) Time on 5xSTS will improve to <12s w/out instability upon standing to indicate improved functional LE strength and decreased risk of falls (Times >/= 12s = further need to assess fall risk in community dwelling older adults).   4.) Distance on 6MWT will improve by 61m w/ more normalized BP response (</= 20/10 mmHg change) to indicate improvements in endurance and cardiovascular response.   5.) Patient will demonstrate ability to complete all trials of mCTSIB w/ up to mod instability and for total duration of 30s each to indicate improvements in static balance and righting strategies and to reduce overall fall risk.  6.) Patient will demonstrate adequate ankle, hip, and reactive stepping strategies to reduce fall risk and promote safety of mobility tasks.  7.) Patient will demonstrate ability to ambulate >500ft w/out device and w/ more normalized mechanics, including heel strike at IC and reduced lateral trunk lean during ipsilateral stance, to promote return to baseline function and improve safety of ambulatory tasks.  8.) Patient will report ability to negotiate stairs w/ unilateral UE support and <minimal subjective instability to promote return to baseline function.   9.) Patient will report ability to negotiate curbs w/out UE support and < minimal subjective instability to promote return to baseline function and improve ability to negotiate community environments.   10.) Patient will report ability to attend grandchildrens' sporting events w/ LRAD and < minimal subjective instability to promote return to community participation.

## 2024-12-13 ENCOUNTER — TREATMENT (OUTPATIENT)
Dept: PHYSICAL THERAPY | Facility: CLINIC | Age: 74
End: 2024-12-13
Payer: MEDICARE

## 2024-12-13 DIAGNOSIS — R26.9 IMPAIRED GAIT: ICD-10-CM

## 2024-12-13 DIAGNOSIS — R26.89 IMPAIRMENT OF BALANCE: ICD-10-CM

## 2024-12-13 DIAGNOSIS — Z74.09 IMPAIRED FUNCTIONAL MOBILITY AND ENDURANCE: Primary | ICD-10-CM

## 2024-12-13 DIAGNOSIS — M62.89 PROXIMAL WEAKNESS OF EXTREMITY: ICD-10-CM

## 2024-12-13 PROCEDURE — 97112 NEUROMUSCULAR REEDUCATION: CPT | Mod: GP | Performed by: PHYSICAL THERAPIST

## 2024-12-13 PROCEDURE — 97110 THERAPEUTIC EXERCISES: CPT | Mod: GP | Performed by: PHYSICAL THERAPIST

## 2024-12-13 NOTE — PROGRESS NOTES
"Physical Therapy    Physical Therapy Treatment    Patient Name: Jennifer Boogie  MRN: [id number]  Today's Date: 12/13/2024    Time Entry:   Time Calculation  Start Time: 1007  Stop Time: 1046  Time Calculation (min): 39 min     PT Therapeutic Procedures Time Entry  Neuromuscular Re-Education Time Entry: 30  Therapeutic Exercise Time Entry: 8                   Insurance: [facility] A/B 90D  -AARP supplemental      Visit Number: 9 (8/10)  -Authorized Visits: 10 (1-2x/week)  -Authorized Date Range: 10/29/2024 - 1/27/2024    Assessment:   Session focused on progressing functional endurance and balance in the context of active weight shifting, SLS control, and curb step negotiation. Patient tolerated very well and had best session to date in terms of active weight shift performance. Still w/ lack of full weight shift (performing ~50% of potential distance), however this improved w/ demonstrative and tactile cueing which resulted in more controlled SLS ability and balance performance. Some hesitation noted w/ curb descension from 4\" step, however improved confidence w/ repetition and reminder to perform weight shift. Overall, patient appears to be progressing towards goals, but continues to demonstrate impairments in weight shifting and postural control. At this time, patient continues to require skilled PT services to address ongoing impairments in functional activity tolerance, neuromuscular control, SLS stability, and gait mechanics to reduce fall risk and promote return to baseline function.    Plan:   Continue with POC.     Next Visit:   -Begin with NuStep  -Functional Strength/Endurance: Blazepods, circuit training, walking trials during session, hip ABD exercises, core exercises  -Balance: vestibular challenges, somatosensory challenges, Blazepods   -Gait mechanics: heel strike, knee flexion (can use Blazepods for this w/ tapping up to step)    Current Problem  1. Impaired functional mobility and endurance        2. Proximal " "weakness of extremity        3. Impairment of balance        4. Impaired gait            Subjective    General  Patient presents to the clinic ambulatory w/ SPC and w/ recent cataract surgery from which she is healing nicely. Patient does not report any limitations with PT intervention outside of lifting restriction (I.e. gallon of milk). Other than that, patient is States compliance w/ HEP and is w/out questions to concerns.       Objective     Treatments:  Therapeutic Exercise (79893): 8 minutes  To improve LE and core strength/endurance:   -NuStep, x 8 min, lvl 1- to comply w/ post-cataract restrictions    Neuromuscular Re-Education (31355): 30 minutes  To improve SLS stability, weight shift, and ankle strategies:  -Blazepods:    -LAT LE taps up to 4\" box (3 pods) on flat ground, random, 2 x 1:30 min R/L - 50-75% appropriate weight shift on L LE, 75% appropriate weight shift on R LE   -LAT LE taps up to 6\"box (3 pods) on flat ground, random, 2 x 1:30 min R/L - 75% appropriate weight shift on L LE, 75% appropriate weight shift on R LE    To improve curb negotiation and balance during functional task:   -FWD step-downs from 4\" box w/out UE support, x 10 R/L  -FWD step-up to 4\" box w/out UE support, x 5 R/L    Current HEP:  Access Code: [email or web address]  URL: [email or web address]  Date: 10/29/2024  Prepared by: Leia Guadalupe     Exercises  - Standing Forward Step Taps with Counter Support  - 1 x daily - 7 x weekly - 3 sets - 45s hold  - Side Stepping with Counter Support  - 1 x daily - 7 x weekly - 3 sets - 1 min hold  - Romberg Stance with Eyes Closed  - 1 x daily - 7 x weekly - 3 sets - 15s hold  - Romberg Stance with Head Rotation  - 1 x daily - 7 x weekly - 3 sets - 30s hold  - Romberg Stance with Head Nods  - 1 x daily - 7 x weekly - 3 sets - 30s hold    OP EDUCATION:   Provided education on the rational behind interventions performed in the context of ongoing symptoms. Patient verbalized " understanding.    Goals:  By discharge,      1.) Patient will demonstrate independence with HEP to promote symptom relief and facilitate return to prior level of function.  2.) Score on ABC Balance Scale will improve to >67% to indicate reduced fall risk and improved confidence related to balance tasks (Scores <67% indicate risk of falling in older adults).  3.) Time on 5xSTS will improve to <12s w/out instability upon standing to indicate improved functional LE strength and decreased risk of falls (Times >/= 12s = further need to assess fall risk in community dwelling older adults).   4.) Distance on 6MWT will improve by 61m w/ more normalized BP response (</= 20/10 mmHg change) to indicate improvements in endurance and cardiovascular response.   5.) Patient will demonstrate ability to complete all trials of mCTSIB w/ up to mod instability and for total duration of 30s each to indicate improvements in static balance and righting strategies and to reduce overall fall risk.  6.) Patient will demonstrate adequate ankle, hip, and reactive stepping strategies to reduce fall risk and promote safety of mobility tasks.  7.) Patient will demonstrate ability to ambulate >500ft w/out device and w/ more normalized mechanics, including heel strike at IC and reduced lateral trunk lean during ipsilateral stance, to promote return to baseline function and improve safety of ambulatory tasks.  8.) Patient will report ability to negotiate stairs w/ unilateral UE support and <minimal subjective instability to promote return to baseline function.   9.) Patient will report ability to negotiate curbs w/out UE support and < minimal subjective instability to promote return to baseline function and improve ability to negotiate community environments.   10.) Patient will report ability to attend grandchildrens' sporting events w/ LRAD and < minimal subjective instability to promote return to community participation.

## 2024-12-19 ENCOUNTER — TREATMENT (OUTPATIENT)
Dept: PHYSICAL THERAPY | Facility: CLINIC | Age: 74
End: 2024-12-19
Payer: MEDICARE

## 2024-12-19 DIAGNOSIS — Z74.09 IMPAIRED FUNCTIONAL MOBILITY AND ENDURANCE: Primary | ICD-10-CM

## 2024-12-19 DIAGNOSIS — M62.89 PROXIMAL WEAKNESS OF EXTREMITY: ICD-10-CM

## 2024-12-19 DIAGNOSIS — R26.9 IMPAIRED GAIT: ICD-10-CM

## 2024-12-19 DIAGNOSIS — R26.89 IMPAIRMENT OF BALANCE: ICD-10-CM

## 2024-12-19 PROCEDURE — 97110 THERAPEUTIC EXERCISES: CPT | Mod: GP | Performed by: PHYSICAL THERAPIST

## 2024-12-19 PROCEDURE — 97112 NEUROMUSCULAR REEDUCATION: CPT | Mod: GP | Performed by: PHYSICAL THERAPIST

## 2024-12-19 NOTE — PROGRESS NOTES
Physical Therapy    Physical Therapy Treatment    Patient Name: Jennifer Boogie  MRN: [id number]  Today's Date: 12/19/2024    Time Entry:   Time Calculation  Start Time: 1000  Stop Time: 1045  Time Calculation (min): 45 min     PT Therapeutic Procedures Time Entry  Neuromuscular Re-Education Time Entry: 26  Therapeutic Exercise Time Entry: 12                   Insurance: [facility] A/B 90D  -[employer] supplemental      Visit Number: 10 (9/10)  -Authorized Visits: 10 (1-2x/week)  -Authorized Date Range: 10/29/2024 - 1/27/2024    Assessment:   Session focused on progressing functional endurance and balance in the context of active weight shifting, SLS control, and curb step negotiation. Patient initially very challenged w/ balance confidence and proper weight shift today leading to increased time w/ Blazepods as PT was trialing out various cues to assist patient in succeeding w/ interventions. Through repetition, identified that patient was not performing appropriate hip hiking or engaging lateral hip musculature, so diverted attention to standing hip hike exercise. Patient responded well to verbal and demonstrative cueing to engage appropriate musculature and verbalized soreness in appropriate region. Provided this exercise for patient to practice at home. At this time, patient continues to require skilled PT services to address ongoing impairments in functional activity tolerance, neuromuscular control, SLS stability, and gait mechanics to reduce fall risk and promote return to baseline function.    Plan:   Continue with POC.     Next Visit:   -Begin with NuStep  -Functional Strength/Endurance: Blazepods, circuit training, walking trials during session, hip ABD exercises, core exercises  -Balance: vestibular challenges, somatosensory challenges, Blazepods   -Gait mechanics: heel strike, knee flexion (can use Blazepods for this w/ tapping up to step)    Current Problem  1. Impaired functional mobility and endurance        2.  "Proximal weakness of extremity        3. Impairment of balance        4. Impaired gait            Subjective    General  Patient presents to the clinic ambulatory w/ SPC and w/ no new reports from last session. States that low back has not been hurting as much due to the stuff she's been working on in PT. States compliance w/ HEP and is w/out questions to concerns.       Objective     Treatments:  Therapeutic Exercise (45869): 12 minutes  To improve LE and core strength/endurance:   -NuStep, x 8 min, lvl 1  -Education: Provided education regarding the importance of pelvic stabilizers and lateral glute musculature in the context of balance, gait, and weight shift. Provided demonstrative and verbal cues to enhance patient understanding. Patient verbalized understanding.    Neuromuscular Re-Education (08500): 26 minutes  To improve SLS stability, weight shift, and ankle strategies:  -Blazepods:    -LAT LE taps up to 6\"box (3 pods) on flat ground, random, 2 x 1:30 min R/L - 75% appropriate weight shift on L LE, 75% appropriate weight shift on R LE    -Progressed to 3 x 2 min R/L d/t patient difficulty performing today and to allow more time to work through correct movements    To improve neuromuscular control of pelvic region:  -Standing hip hiking from ground, 2 x 10 R/L - pt responded very well to cue of driving standing leg into ground    To improve curb negotiation and balance during functional task:   -FWD step-downs from 4\" box w/out UE support, x 3 R/L  -FWD step-up to 4\" box w/out UE support, x 3 R/L    Current HEP:  Access Code: [email or web address]  URL: [email or web address]  Date: 10/29/2024  Prepared by: Leia Guadalupe     Exercises  - Standing Forward Step Taps with Counter Support  - 1 x daily - 7 x weekly - 3 sets - 45s hold  - Side Stepping with Counter Support  - 1 x daily - 7 x weekly - 3 sets - 1 min hold  - Romberg Stance with Eyes Closed  - 1 x daily - 7 x weekly - 3 sets - 15s hold  - " Romberg Stance with Head Rotation  - 1 x daily - 7 x weekly - 3 sets - 30s hold  - Romberg Stance with Head Nods  - 1 x daily - 7 x weekly - 3 sets - 30s hold    OP EDUCATION:   Provided education on the rational behind interventions performed in the context of ongoing symptoms. Patient verbalized understanding.    Goals:  By discharge,      1.) Patient will demonstrate independence with HEP to promote symptom relief and facilitate return to prior level of function.  2.) Score on ABC Balance Scale will improve to >67% to indicate reduced fall risk and improved confidence related to balance tasks (Scores <67% indicate risk of falling in older adults).  3.) Time on 5xSTS will improve to <12s w/out instability upon standing to indicate improved functional LE strength and decreased risk of falls (Times >/= 12s = further need to assess fall risk in community dwelling older adults).   4.) Distance on 6MWT will improve by 61m w/ more normalized BP response (</= 20/10 mmHg change) to indicate improvements in endurance and cardiovascular response.   5.) Patient will demonstrate ability to complete all trials of mCTSIB w/ up to mod instability and for total duration of 30s each to indicate improvements in static balance and righting strategies and to reduce overall fall risk.  6.) Patient will demonstrate adequate ankle, hip, and reactive stepping strategies to reduce fall risk and promote safety of mobility tasks.  7.) Patient will demonstrate ability to ambulate >500ft w/out device and w/ more normalized mechanics, including heel strike at IC and reduced lateral trunk lean during ipsilateral stance, to promote return to baseline function and improve safety of ambulatory tasks.  8.) Patient will report ability to negotiate stairs w/ unilateral UE support and <minimal subjective instability to promote return to baseline function.   9.) Patient will report ability to negotiate curbs w/out UE support and < minimal subjective  instability to promote return to baseline function and improve ability to negotiate community environments.   10.) Patient will report ability to attend grandchildrens' sporting events w/ LRAD and < minimal subjective instability to promote return to community participation.

## 2024-12-23 NOTE — PROGRESS NOTES
Physical Therapy    Physical Therapy Treatment    Patient Name: Jennifer Boogie  MRN: [id number]  Today's Date: 12/24/2024    Time Entry:   Time Calculation  Start Time: 0846  Stop Time: 0926  Time Calculation (min): 40 min     PT Therapeutic Procedures Time Entry  Therapeutic Activity Time Entry: 38                   Insurance: [facility] A/B 90D  -[employer] supplemental      Visit Number: 11 (10/10)  -Authorized Visits: 10 (1-2x/week)  -Authorized Date Range: 10/29/2024 - 1/27/2024    Assessment:   Session focused on performing reassessment d/t reaching end of original PT POC. Subjectively, patient endorses improving balance and confidence and also reports that her  has commented that her walking appears better. Patient continues to report ongoing challenges w/ endurance, gait mechanics, and gait stability. Objectively, time achieved on 5xSTS is improved and now WNL indicating low fall risk and improved functional LE strength. Performance on modified CTSIB testing is much better than IE w/ patient now able to balance under all conditions with < moderate instability, for entire 30s of each condition, and w/ less overall apprehension. Distance achieved on 6MWT did not improve from IE despite targeted endurance training. This indicates a potential need for redirecting focus to orthopedic impairments to improve mechanical efficiency of gait pattern for indirect impact on endurance. Patient has met 3/10 goals and is demonstrating progress towards the rest. Due to the above information, PT is recommending extension of PT POC by 12 visits at 1x/week frequency to continue progressing towards remaining goals. At this time, patient continues to require skilled PT services to address ongoing impairments in functional activity tolerance, neuromuscular control, SLS stability, and gait mechanics to reduce fall risk and promote return to baseline function.    Plan:   Continue with POC. Request additional 12 visits at 1x/week frequency.      Next Visit:   -Focus on back and hip mobility and strength   -Walking balance interventions    Current Problem  1. Impaired functional mobility and endurance        2. Proximal weakness of extremity        3. Impairment of balance        4. Impaired gait            Subjective    General  Patient presents to the clinic ambulatory w/out device and w/ reports of trying new exercise from last session and feeling like it is activating the right hip musculature. Since beginning PT, patient endorses that her confidence feels like it's improving and also feels like her balance is getting better too. States that her  has told her her walking has gotten better. Patient still feels like her endurance is still challenging. Since IE, patient endorses only 30% improvement. Remaining 70% encompasses ongoing lack of endurance (wants to be able to walk at least 30 minutes w/out sitting), walking balance and mechanics, and being able to negotiate curbs. States compliance w/ HEP and is w/out questions to concerns.       Objective   mCTSIB:  -Flat ground, EO: 30s, no sway  -Flat ground, EC: 30s, min sway  -Airex, EO: 30s, min sway  -Airex, EC: 30s, mod sway    Outcome Measures:  Other Measures  5x Sit to Stand: 11.34s (w/ unilateral UE support)  6 min Walk Test: 514ft (156m)     Treatments:  Therapeutic Activity (44897): 38 minutes  -Reassessment of 5xSTS, see objective section  -Reassessment of 6MWT, see objective section  -Reassessment of mCTSIB, see objective section  -Reassessment of goals, see below  -Education: Provided education on the results on the outcome measures, interpretation of scores, and impact on PT POC. Discussed trying to take sessions back to basics and focus on orthopedic side of things to attempt and change mechanics to impact gait efficiency and endurance since traditional endurance training did not produce results over 10 visits. Provided education on comparison of function and results of outcomes to  IE to promote therapeutic encouragement. Patient verbalized understanding.    Current HEP:  Access Code: [email or web address]  URL: [email or web address]  Date: 12/24/2024  Prepared by: Leia Guadalupe    Exercises  - Romberg Stance Eyes Closed on Foam Pad  - 1 x daily - 7 x weekly - 3 sets - 30s hold  - Romberg Stance on Foam Pad with Head Rotation  - 1 x daily - 7 x weekly - 3 sets - 30s hold  - Romberg Stance with Head Nods on Foam Pad  - 1 x daily - 7 x weekly - 3 sets - 30s hold  - Standing Hip Hiking  - 1 x daily - 7 x weekly - 2 sets - 10 reps  - Side Stepping with Resistance at Ankles  - 1 x daily - 7 x weekly - 3 sets - 2 min hold    OP EDUCATION:   Provided education on the rational behind interventions performed in the context of ongoing symptoms. Patient verbalized understanding.    Goals:  By discharge,      1.) Patient will demonstrate independence with HEP to promote symptom relief and facilitate return to prior level of function. - ONGOING 12/23/2024   2.) Score on ABC Balance Scale will improve to >67% to indicate reduced fall risk and improved confidence related to balance tasks (Scores <67% indicate risk of falling in older adults). - NT 12/23/2024 d/t time constraints  3.) Time on 5xSTS will improve to <12s w/out instability upon standing to indicate improved functional LE strength and decreased risk of falls (Times >/= 12s = further need to assess fall risk in community dwelling older adults). - MET 12/23/2024   4.) Distance on 6MWT will improve by 61m w/ more normalized BP response (</= 20/10 mmHg change) to indicate improvements in endurance and cardiovascular response. - ONGOING 12/23/2024   5.) Patient will demonstrate ability to complete all trials of mCTSIB w/ up to mod instability and for total duration of 30s each to indicate improvements in static balance and righting strategies and to reduce overall fall risk. - MET 12/23/2024   6.) Patient will demonstrate adequate ankle,  hip, and reactive stepping strategies to reduce fall risk and promote safety of mobility tasks. - ONGOING 12/23/2024   7.) Patient will demonstrate ability to ambulate >500ft w/out device and w/ more normalized mechanics, including heel strike at IC and reduced lateral trunk lean during ipsilateral stance, to promote return to baseline function and improve safety of ambulatory tasks. - ONGOING 12/23/2024   8.) Patient will report ability to negotiate stairs w/ unilateral UE support and < minimal subjective instability to promote return to baseline function. - MET 12/23/2024   9.) Patient will report ability to negotiate curbs w/out UE support and < minimal subjective instability to promote return to baseline function and improve ability to negotiate community environments. - PARTIALLY MET 12/23/2024   10.) Patient will report ability to attend grandchildrens' sporting events w/ LRAD and < minimal subjective instability to promote return to community participation. - PARTIALLY MET 12/23/2024

## 2024-12-24 ENCOUNTER — TREATMENT (OUTPATIENT)
Dept: PHYSICAL THERAPY | Facility: CLINIC | Age: 74
End: 2024-12-24
Payer: MEDICARE

## 2024-12-24 DIAGNOSIS — R26.9 IMPAIRED GAIT: ICD-10-CM

## 2024-12-24 DIAGNOSIS — M62.89 PROXIMAL WEAKNESS OF EXTREMITY: ICD-10-CM

## 2024-12-24 DIAGNOSIS — Z74.09 IMPAIRED FUNCTIONAL MOBILITY AND ENDURANCE: Primary | ICD-10-CM

## 2024-12-24 DIAGNOSIS — R26.89 IMPAIRMENT OF BALANCE: ICD-10-CM

## 2024-12-24 PROCEDURE — 97530 THERAPEUTIC ACTIVITIES: CPT | Mod: GP | Performed by: PHYSICAL THERAPIST

## 2024-12-24 NOTE — LETTER
December 24, 2024    Seema GUTHRIE MD  [id number] [address] [address]    Patient: Jennifer Boogie   YOB: 1950   Date of Visit: 12/24/2024       Dear No referring provider defined for this encounter.    The attached plan of care is being sent to you because your patient’s medical reimbursement requires that you certify the plan of care. Your signature is required to allow uninterrupted insurance coverage.      You may indicate your approval by signing below and faxing this form back to us at Dept Fax: [phone].    Please call Dept: [phone] with any questions or concerns.    Thank you for this referral,        Leia Guadalupe PT  [facility] [address] [facility]  [address] [facility] [address]    Payer: Payor: MEDICARE / Plan: MEDICARE PART A AND B / Product Type: *No Product type* /                                                                         Date:     Dear Leia Guadalupe PT,     Re: Ms. Jennifer Boogie, MRN:[id number]    I certify that I have reviewed the attached plan of care and it is medically necessary for Ms. Jennifer Boogie (1950) who is under my care.          ______________________________________                    _________________  Provider name and credentials                                           Date and time                                                                                           Plan of Care 12/24/24   Effective from: 12/24/2024  Effective to: 3/24/2025    Plan ID: [id number]            Participants as of Finalize on 12/24/2024    Name Type Comments Contact Info    Seema GUTHRIE MD PCP - General For MCR re-certification. Please sign at your earliest convenience. [phone]       Last Plan Note     Author: Leia Guadalupe PT Status: Sign when Signing Visit Last edited: 12/24/2024  8:45 AM       Physical Therapy    Physical Therapy Treatment    Patient Name: Jennifer Boogie  MRN: [id number]  Today's  Date: 12/24/2024    Time Entry:   Time Calculation  Start Time: 0846  Stop Time: 0926  Time Calculation (min): 40 min     PT Therapeutic Procedures Time Entry  Therapeutic Activity Time Entry: 38                   Insurance: [facility] A/B 90D  -[facility] supplemental      Visit Number: 11 (10/10)  -Authorized Visits: 10 (1-2x/week)  -Authorized Date Range: 10/29/2024 - 1/27/2024    Assessment:   Session focused on performing reassessment d/t reaching end of original PT POC. Subjectively, patient endorses improving balance and confidence and also reports that her  has commented that her walking appears better. Patient continues to report ongoing challenges w/ endurance, gait mechanics, and gait stability. Objectively, time achieved on 5xSTS is improved and now WNL indicating low fall risk and improved functional LE strength. Performance on modified CTSIB testing is much better than IE w/ patient now able to balance under all conditions with < moderate instability, for entire 30s of each condition, and w/ less overall apprehension. Distance achieved on 6MWT did not improve from IE despite targeted endurance training. This indicates a potential need for redirecting focus to orthopedic impairments to improve mechanical efficiency of gait pattern for indirect impact on endurance. Patient has met 3/10 goals and is demonstrating progress towards the rest. Due to the above information, PT is recommending extension of PT POC by 12 visits at 1x/week frequency to continue progressing towards remaining goals. At this time, patient continues to require skilled PT services to address ongoing impairments in functional activity tolerance, neuromuscular control, SLS stability, and gait mechanics to reduce fall risk and promote return to baseline function.    Plan:   Continue with POC. Request additional 12 visits at 1x/week frequency.     Next Visit:   -Focus on back and hip mobility and strength   -Walking balance  interventions    Current Problem  1. Impaired functional mobility and endurance        2. Proximal weakness of extremity        3. Impairment of balance        4. Impaired gait            Subjective    General  Patient presents to the clinic ambulatory w/out device and w/ reports of trying new exercise from last session and feeling like it is activating the right hip musculature. Since beginning PT, patient endorses that her confidence feels like it's improving and also feels like her balance is getting better too. States that her  has told her her walking has gotten better. Patient still feels like her endurance is still challenging. Since IE, patient endorses only 30% improvement. Remaining 70% encompasses ongoing lack of endurance (wants to be able to walk at least 30 minutes w/out sitting), walking balance and mechanics, and being able to negotiate curbs. States compliance w/ HEP and is w/out questions to concerns.       Objective   mCTSIB:  -Flat ground, EO: 30s, no sway  -Flat ground, EC: 30s, min sway  -Airex, EO: 30s, min sway  -Airex, EC: 30s, mod sway    Outcome Measures:  Other Measures  5x Sit to Stand: 11.34s (w/ unilateral UE support)  6 min Walk Test: 514ft (156m)     Treatments:  Therapeutic Activity (98901): 38 minutes  -Reassessment of 5xSTS, see objective section  -Reassessment of 6MWT, see objective section  -Reassessment of mCTSIB, see objective section  -Reassessment of goals, see below  -Education: Provided education on the results on the outcome measures, interpretation of scores, and impact on PT POC. Discussed trying to take sessions back to basics and focus on orthopedic side of things to attempt and change mechanics to impact gait efficiency and endurance since traditional endurance training did not produce results over 10 visits. Provided education on comparison of function and results of outcomes to IE to promote therapeutic encouragement. Patient verbalized  understanding.    Current HEP:  Access Code: [email or web address]  URL: [email or web address]  Date: 12/24/2024  Prepared by: Leia Guadalupe    Exercises  - Romberg Stance Eyes Closed on Foam Pad  - 1 x daily - 7 x weekly - 3 sets - 30s hold  - Romberg Stance on Foam Pad with Head Rotation  - 1 x daily - 7 x weekly - 3 sets - 30s hold  - Romberg Stance with Head Nods on Foam Pad  - 1 x daily - 7 x weekly - 3 sets - 30s hold  - Standing Hip Hiking  - 1 x daily - 7 x weekly - 2 sets - 10 reps  - Side Stepping with Resistance at Ankles  - 1 x daily - 7 x weekly - 3 sets - 2 min hold    OP EDUCATION:   Provided education on the rational behind interventions performed in the context of ongoing symptoms. Patient verbalized understanding.    Goals:  By discharge,      1.) Patient will demonstrate independence with HEP to promote symptom relief and facilitate return to prior level of function. - ONGOING 12/23/2024   2.) Score on ABC Balance Scale will improve to >67% to indicate reduced fall risk and improved confidence related to balance tasks (Scores <67% indicate risk of falling in older adults). - NT 12/23/2024 d/t time constraints  3.) Time on 5xSTS will improve to <12s w/out instability upon standing to indicate improved functional LE strength and decreased risk of falls (Times >/= 12s = further need to assess fall risk in community dwelling older adults). - MET 12/23/2024   4.) Distance on 6MWT will improve by 61m w/ more normalized BP response (</= 20/10 mmHg change) to indicate improvements in endurance and cardiovascular response. - ONGOING 12/23/2024   5.) Patient will demonstrate ability to complete all trials of mCTSIB w/ up to mod instability and for total duration of 30s each to indicate improvements in static balance and righting strategies and to reduce overall fall risk. - MET 12/23/2024   6.) Patient will demonstrate adequate ankle, hip, and reactive stepping strategies to reduce fall risk and  promote safety of mobility tasks. - ONGOING 12/23/2024   7.) Patient will demonstrate ability to ambulate >500ft w/out device and w/ more normalized mechanics, including heel strike at IC and reduced lateral trunk lean during ipsilateral stance, to promote return to baseline function and improve safety of ambulatory tasks. - ONGOING 12/23/2024   8.) Patient will report ability to negotiate stairs w/ unilateral UE support and < minimal subjective instability to promote return to baseline function. - MET 12/23/2024   9.) Patient will report ability to negotiate curbs w/out UE support and < minimal subjective instability to promote return to baseline function and improve ability to negotiate community environments. - PARTIALLY MET 12/23/2024   10.) Patient will report ability to attend grandchildrens' sporting events w/ LRAD and < minimal subjective instability to promote return to community participation. - PARTIALLY MET 12/23/2024          Current Participants as of 12/24/2024    Name Type Comments Contact Info    Seema GUTHRIE MD PCP - General For [facility] re-certification. Please sign at your earliest convenience. [phone]    Signature pending

## 2025-01-17 ENCOUNTER — APPOINTMENT (OUTPATIENT)
Dept: PHYSICAL THERAPY | Facility: CLINIC | Age: 75
End: 2025-01-17
Payer: MEDICARE

## 2025-01-17 DIAGNOSIS — R26.9 IMPAIRED GAIT: ICD-10-CM

## 2025-01-17 DIAGNOSIS — R26.89 IMPAIRMENT OF BALANCE: ICD-10-CM

## 2025-01-17 DIAGNOSIS — Z74.09 IMPAIRED FUNCTIONAL MOBILITY AND ENDURANCE: Primary | ICD-10-CM

## 2025-01-17 DIAGNOSIS — M62.89 PROXIMAL WEAKNESS OF EXTREMITY: ICD-10-CM

## 2025-01-24 ENCOUNTER — APPOINTMENT (OUTPATIENT)
Dept: PHYSICAL THERAPY | Facility: CLINIC | Age: 75
End: 2025-01-24
Payer: MEDICARE

## 2025-01-24 DIAGNOSIS — Z74.09 IMPAIRED FUNCTIONAL MOBILITY AND ENDURANCE: Primary | ICD-10-CM

## 2025-01-24 DIAGNOSIS — M62.89 PROXIMAL WEAKNESS OF EXTREMITY: ICD-10-CM

## 2025-01-24 DIAGNOSIS — R26.9 IMPAIRED GAIT: ICD-10-CM

## 2025-01-24 DIAGNOSIS — R26.89 IMPAIRMENT OF BALANCE: ICD-10-CM

## 2025-01-31 ENCOUNTER — APPOINTMENT (OUTPATIENT)
Dept: PHYSICAL THERAPY | Facility: CLINIC | Age: 75
End: 2025-01-31
Payer: MEDICARE

## 2025-01-31 DIAGNOSIS — R26.9 IMPAIRED GAIT: ICD-10-CM

## 2025-01-31 DIAGNOSIS — M62.89 PROXIMAL WEAKNESS OF EXTREMITY: ICD-10-CM

## 2025-01-31 DIAGNOSIS — Z74.09 IMPAIRED FUNCTIONAL MOBILITY AND ENDURANCE: Primary | ICD-10-CM

## 2025-01-31 DIAGNOSIS — R26.89 IMPAIRMENT OF BALANCE: ICD-10-CM

## 2025-01-31 PROCEDURE — 97110 THERAPEUTIC EXERCISES: CPT | Mod: GP | Performed by: PHYSICAL THERAPIST

## 2025-01-31 PROCEDURE — 97140 MANUAL THERAPY 1/> REGIONS: CPT | Mod: GP | Performed by: PHYSICAL THERAPIST

## 2025-01-31 NOTE — PROGRESS NOTES
Physical Therapy    Physical Therapy Treatment    Patient Name: Jennifer Boogie  MRN: [id number]  Today's Date: 1/31/2025    Time Entry:   Time Calculation  Start Time: 1005  Stop Time: 1045  Time Calculation (min): 40 min     PT Therapeutic Procedures Time Entry  Manual Therapy Time Entry: 15  Therapeutic Exercise Time Entry: 23                   Insurance: [facility] A/B 90D  -[employer] supplemental      Visit Number: 12 (1/12)  -Authorized Visits: 12 (1x/week)  -Authorized Date Range: 12/24/2024 - 3/24/2025    Assessment:   Session focused on initiating training to improve lumbar and hip mobility to assess effect on gait and endurance. Patient tolerated manual therapy very well w/ no subsequent pain or dysfunction. Good form w/ strengthening, however continuing to observe Trendelenberg on R side during gait and strengthening. At this time, patient continues to require skilled PT services to address ongoing impairments in functional activity tolerance, neuromuscular control, SLS stability, and gait mechanics to reduce fall risk and promote return to baseline function.    Plan:   Continue with POC. Request additional 12 visits at 1x/week frequency.     Next Visit:   -Focus on back and hip mobility and strength   -Walking balance interventions    Current Problem  1. Impaired functional mobility and endurance        2. Proximal weakness of extremity        3. Impairment of balance        4. Impaired gait            Subjective    General  Patient presents to the clinic ambulatory w/out device and w/ reports of practicing balance and strengthening at home and still feeling challenged w/ hip hiking exercises. Not feeling any difference in stamina. States compliance w/ HEP and is w/out questions to concerns.       Objective     Treatments:  Therapeutic Exercise (15174): 23 minutes  To improve general LE strength/endurance:  -NuStep, x 5 min, lvl 3    To improve lumbar mobility:  -Seated PB roll-outs to L, x 15     To improve general  proximal LE strength:  -Standing hip ABD w/ yellow TB, x 12 R/L  -Standing clam shell w/ yellow TB, x 12 R/L    Manual Therapy (38490): 15 minutes   To improve general hip mobility:   -Long axis distraction, 3 x 30s R/L   -Lateral distraction w/ mobilization belt, x 5 R/L   -Inferolateral distraction w/ mobilization belt, x 5 R/L     Current HEP:  Access Code: [email or web address]  URL: [email or web address]  Date: 12/24/2024  Prepared by: Leia Guadalupe    Exercises  - Romberg Stance Eyes Closed on Foam Pad  - 1 x daily - 7 x weekly - 3 sets - 30s hold  - Romberg Stance on Foam Pad with Head Rotation  - 1 x daily - 7 x weekly - 3 sets - 30s hold  - Romberg Stance with Head Nods on Foam Pad  - 1 x daily - 7 x weekly - 3 sets - 30s hold  - Standing Hip Hiking  - 1 x daily - 7 x weekly - 2 sets - 10 reps  - Side Stepping with Resistance at Ankles  - 1 x daily - 7 x weekly - 3 sets - 2 min hold    OP EDUCATION:   Provided education on the rational behind interventions performed in the context of ongoing symptoms. Patient verbalized understanding.    Goals:  By discharge,      1.) Patient will demonstrate independence with HEP to promote symptom relief and facilitate return to prior level of function. - ONGOING 12/23/2024   2.) Score on ABC Balance Scale will improve to >67% to indicate reduced fall risk and improved confidence related to balance tasks (Scores <67% indicate risk of falling in older adults). - NT 12/23/2024 d/t time constraints  3.) Time on 5xSTS will improve to <12s w/out instability upon standing to indicate improved functional LE strength and decreased risk of falls (Times >/= 12s = further need to assess fall risk in community dwelling older adults). - MET 12/23/2024   4.) Distance on 6MWT will improve by 61m w/ more normalized BP response (</= 20/10 mmHg change) to indicate improvements in endurance and cardiovascular response. - ONGOING 12/23/2024   5.) Patient will demonstrate ability to  complete all trials of mCTSIB w/ up to mod instability and for total duration of 30s each to indicate improvements in static balance and righting strategies and to reduce overall fall risk. - MET 12/23/2024   6.) Patient will demonstrate adequate ankle, hip, and reactive stepping strategies to reduce fall risk and promote safety of mobility tasks. - ONGOING 12/23/2024   7.) Patient will demonstrate ability to ambulate >500ft w/out device and w/ more normalized mechanics, including heel strike at IC and reduced lateral trunk lean during ipsilateral stance, to promote return to baseline function and improve safety of ambulatory tasks. - ONGOING 12/23/2024   8.) Patient will report ability to negotiate stairs w/ unilateral UE support and < minimal subjective instability to promote return to baseline function. - MET 12/23/2024   9.) Patient will report ability to negotiate curbs w/out UE support and < minimal subjective instability to promote return to baseline function and improve ability to negotiate community environments. - PARTIALLY MET 12/23/2024   10.) Patient will report ability to attend grandchildrens' sporting events w/ LRAD and < minimal subjective instability to promote return to community participation. - PARTIALLY MET 12/23/2024

## 2025-02-07 ENCOUNTER — APPOINTMENT (OUTPATIENT)
Dept: PHYSICAL THERAPY | Facility: CLINIC | Age: 75
End: 2025-02-07
Payer: MEDICARE

## 2025-02-07 DIAGNOSIS — Z74.09 IMPAIRED FUNCTIONAL MOBILITY AND ENDURANCE: Primary | ICD-10-CM

## 2025-02-07 DIAGNOSIS — M62.89 PROXIMAL WEAKNESS OF EXTREMITY: ICD-10-CM

## 2025-02-07 DIAGNOSIS — R26.89 IMPAIRMENT OF BALANCE: ICD-10-CM

## 2025-02-07 DIAGNOSIS — R26.9 IMPAIRED GAIT: ICD-10-CM

## 2025-02-13 ENCOUNTER — APPOINTMENT (OUTPATIENT)
Dept: PHYSICAL THERAPY | Facility: CLINIC | Age: 75
End: 2025-02-13
Payer: MEDICARE

## 2025-02-13 DIAGNOSIS — R26.9 IMPAIRED GAIT: ICD-10-CM

## 2025-02-13 DIAGNOSIS — Z74.09 IMPAIRED FUNCTIONAL MOBILITY AND ENDURANCE: Primary | ICD-10-CM

## 2025-02-13 DIAGNOSIS — M62.89 PROXIMAL WEAKNESS OF EXTREMITY: ICD-10-CM

## 2025-02-13 DIAGNOSIS — R26.89 IMPAIRMENT OF BALANCE: ICD-10-CM

## 2025-02-13 PROCEDURE — 97140 MANUAL THERAPY 1/> REGIONS: CPT | Mod: GP | Performed by: PHYSICAL THERAPIST

## 2025-02-13 PROCEDURE — 97110 THERAPEUTIC EXERCISES: CPT | Mod: GP | Performed by: PHYSICAL THERAPIST

## 2025-02-13 NOTE — PROGRESS NOTES
Physical Therapy    Physical Therapy Treatment    Patient Name: Jennifer Boogie  MRN: [id number]  Today's Date: 2/13/2025    Time Entry:   Time Calculation  Start Time: 1004  Stop Time: 1045  Time Calculation (min): 41 min     PT Therapeutic Procedures Time Entry  Manual Therapy Time Entry: 11  Therapeutic Exercise Time Entry: 29                   Insurance: [facility] A/B 90D  -[employer] supplemental      Visit Number: 13 (2/12)  -Authorized Visits: 12 (1x/week)  -Authorized Date Range: 12/24/2024 - 3/24/2025    Assessment:   Session focused on progressing spinal/hip mobility and proximal LE strength. Patient tolerated very well and demonstrated appropriate form w/ all interventions and appropriate muscular activation. Hopeful that with improved proximal strength and stability that gait mechanics will improve and patient's gait will become more economical leading to subjectively improved endurance. At this time, patient continues to require skilled PT services to address ongoing impairments in functional activity tolerance, neuromuscular control, SLS stability, and gait mechanics to reduce fall risk and promote return to baseline function.    Plan:   Continue with POC.     Next Visit:   -Focus on back and hip mobility and strength   -Walking balance interventions    Current Problem  1. Impaired functional mobility and endurance        2. Proximal weakness of extremity        3. Impairment of balance        4. Impaired gait            Subjective    General  Patient presents to the clinic ambulatory w/out device and w/ no new reports from last session. States compliance w/ HEP and is w/out questions to concerns.       Objective     Treatments:  Therapeutic Exercise (98058): 29 minutes  To improve general LE strength/endurance:  -NuStep, x 6 min, lvl 4    To improve lumbar mobility:  -Seated PB roll-outs to L, x 15     To improve general proximal LE strength:  -Supine glute bridges w/ unilateral LE extended, 2 x 12 R/L  -Standing  "hip ABD w/ yellow TB, x 12 R/L  -LAT taps up to 8\" step + green 1/2 roll on top w/ light UE support, x 15 R/L  -Standing paloff press w/ green TB, 2 x 12 R/L      Manual Therapy (35161): 11 minutes   To improve general hip mobility:   -Lateral distraction w/ mobilization belt, x 5 R/L   -Inferolateral distraction w/ mobilization belt, x 5 R/L     Current HEP:  Access Code: [email or web address]  URL: [email or web address]  Date: 12/24/2024  Prepared by: Leia Guadalupe    Exercises  - Romberg Stance Eyes Closed on Foam Pad  - 1 x daily - 7 x weekly - 3 sets - 30s hold  - Romberg Stance on Foam Pad with Head Rotation  - 1 x daily - 7 x weekly - 3 sets - 30s hold  - Romberg Stance with Head Nods on Foam Pad  - 1 x daily - 7 x weekly - 3 sets - 30s hold  - Standing Hip Hiking  - 1 x daily - 7 x weekly - 2 sets - 10 reps  - Side Stepping with Resistance at Ankles  - 1 x daily - 7 x weekly - 3 sets - 2 min hold    OP EDUCATION:   Provided education on the rational behind interventions performed in the context of ongoing symptoms. Patient verbalized understanding.    Goals:  By discharge,      1.) Patient will demonstrate independence with HEP to promote symptom relief and facilitate return to prior level of function. - ONGOING 12/23/2024   2.) Score on ABC Balance Scale will improve to >67% to indicate reduced fall risk and improved confidence related to balance tasks (Scores <67% indicate risk of falling in older adults). - NT 12/23/2024 d/t time constraints  3.) Time on 5xSTS will improve to <12s w/out instability upon standing to indicate improved functional LE strength and decreased risk of falls (Times >/= 12s = further need to assess fall risk in community dwelling older adults). - MET 12/23/2024   4.) Distance on 6MWT will improve by 61m w/ more normalized BP response (</= 20/10 mmHg change) to indicate improvements in endurance and cardiovascular response. - ONGOING 12/23/2024   5.) Patient will " demonstrate ability to complete all trials of mCTSIB w/ up to mod instability and for total duration of 30s each to indicate improvements in static balance and righting strategies and to reduce overall fall risk. - MET 12/23/2024   6.) Patient will demonstrate adequate ankle, hip, and reactive stepping strategies to reduce fall risk and promote safety of mobility tasks. - ONGOING 12/23/2024   7.) Patient will demonstrate ability to ambulate >500ft w/out device and w/ more normalized mechanics, including heel strike at IC and reduced lateral trunk lean during ipsilateral stance, to promote return to baseline function and improve safety of ambulatory tasks. - ONGOING 12/23/2024   8.) Patient will report ability to negotiate stairs w/ unilateral UE support and < minimal subjective instability to promote return to baseline function. - MET 12/23/2024   9.) Patient will report ability to negotiate curbs w/out UE support and < minimal subjective instability to promote return to baseline function and improve ability to negotiate community environments. - PARTIALLY MET 12/23/2024   10.) Patient will report ability to attend grandchildrens' sporting events w/ LRAD and < minimal subjective instability to promote return to community participation. - PARTIALLY MET 12/23/2024

## 2025-02-20 ENCOUNTER — APPOINTMENT (OUTPATIENT)
Dept: PHYSICAL THERAPY | Facility: CLINIC | Age: 75
End: 2025-02-20
Payer: MEDICARE

## 2025-02-20 DIAGNOSIS — Z74.09 IMPAIRED FUNCTIONAL MOBILITY AND ENDURANCE: Primary | ICD-10-CM

## 2025-02-20 DIAGNOSIS — R26.89 IMPAIRMENT OF BALANCE: ICD-10-CM

## 2025-02-20 DIAGNOSIS — M62.89 PROXIMAL WEAKNESS OF EXTREMITY: ICD-10-CM

## 2025-02-20 DIAGNOSIS — R26.9 IMPAIRED GAIT: ICD-10-CM

## 2025-02-20 PROCEDURE — 97110 THERAPEUTIC EXERCISES: CPT | Mod: GP | Performed by: PHYSICAL THERAPIST

## 2025-02-20 NOTE — PROGRESS NOTES
Physical Therapy    Physical Therapy Treatment    Patient Name: Jennifer Boogie  MRN: [id number]  Today's Date: 2/20/2025    Time Entry:   Time Calculation  Start Time: 1002  Stop Time: 1045  Time Calculation (min): 43 min     PT Therapeutic Procedures Time Entry  Therapeutic Exercise Time Entry: 40                   Insurance: [facility] A/B 90D  -[employer] supplemental      Visit Number: 14 (3/12)  -Authorized Visits: 12 (1x/week)  -Authorized Date Range: 12/24/2024 - 3/24/2025    Assessment:   Session focused on progressing proximal LE strength and touching on SLS stability. Patient tolerated very well, but evident weakness in L LE proximal musculature comparatively to R LE. Required increased verbal and demonstrative cueing to achieve proper lateral hip activation, but responded well and reported muscle activation in appropriate location. Still challenged by lateral hip weakness that presents as difficulty w/ weightshifting and maintaining SLS stance. Overall, though, patient appears to be progressing towards goals. At this time, patient continues to require skilled PT services to address ongoing impairments in functional activity tolerance, neuromuscular control, SLS stability, and gait mechanics to reduce fall risk and promote return to baseline function.    Plan:   Continue with POC.     Next Visit:   -Focus on back and hip mobility and strength   -Walking balance interventions    Current Problem  1. Impaired functional mobility and endurance        2. Proximal weakness of extremity        3. Impairment of balance        4. Impaired gait            Subjective    General  Patient presents to the clinic ambulatory w/out device and w/ no new reports from last session. States compliance w/ HEP and is w/out questions to concerns.       Objective     Treatments:  Therapeutic Exercise (04330): 40 minutes  To improve general LE strength/endurance:  -NuStep, x 5 min, lvl 4    To improve lumbar mobility:  -Seated PB roll-outs to L, x  "15     To improve general proximal LE strength:  -FWD step-ups to 8\" box w/ light UE support, 2 x 12 R/L    -Regressed to 6\" box for L LE d/t incr challenge  -Standing on 8\" box + contralateral LE circles, x 10 CW/CCW - Required VC and demonstrative cues to maintain upright posturing  -LAT taps up to 12\" step w/ light UE support, 2 x 15 R/L   -Standing alt marching on flat ground + holding 5# DB out in front, 2 x 12 R/L    Current HEP:  Access Code: [email or web address]  URL: [email or web address]  Date: 12/24/2024  Prepared by: Leia Guadalupe    Exercises  - Romberg Stance Eyes Closed on Foam Pad  - 1 x daily - 7 x weekly - 3 sets - 30s hold  - Romberg Stance on Foam Pad with Head Rotation  - 1 x daily - 7 x weekly - 3 sets - 30s hold  - Romberg Stance with Head Nods on Foam Pad  - 1 x daily - 7 x weekly - 3 sets - 30s hold  - Standing Hip Hiking  - 1 x daily - 7 x weekly - 2 sets - 10 reps  - Side Stepping with Resistance at Ankles  - 1 x daily - 7 x weekly - 3 sets - 2 min hold    OP EDUCATION:   Provided education on the rational behind interventions performed in the context of ongoing symptoms. Patient verbalized understanding.    Goals:  By discharge,      1.) Patient will demonstrate independence with HEP to promote symptom relief and facilitate return to prior level of function. - ONGOING 12/23/2024   2.) Score on ABC Balance Scale will improve to >67% to indicate reduced fall risk and improved confidence related to balance tasks (Scores <67% indicate risk of falling in older adults). - NT 12/23/2024 d/t time constraints  3.) Time on 5xSTS will improve to <12s w/out instability upon standing to indicate improved functional LE strength and decreased risk of falls (Times >/= 12s = further need to assess fall risk in community dwelling older adults). - MET 12/23/2024   4.) Distance on 6MWT will improve by 61m w/ more normalized BP response (</= 20/10 mmHg change) to indicate improvements in endurance " and cardiovascular response. - ONGOING 12/23/2024   5.) Patient will demonstrate ability to complete all trials of mCTSIB w/ up to mod instability and for total duration of 30s each to indicate improvements in static balance and righting strategies and to reduce overall fall risk. - MET 12/23/2024   6.) Patient will demonstrate adequate ankle, hip, and reactive stepping strategies to reduce fall risk and promote safety of mobility tasks. - ONGOING 12/23/2024   7.) Patient will demonstrate ability to ambulate >500ft w/out device and w/ more normalized mechanics, including heel strike at IC and reduced lateral trunk lean during ipsilateral stance, to promote return to baseline function and improve safety of ambulatory tasks. - ONGOING 12/23/2024   8.) Patient will report ability to negotiate stairs w/ unilateral UE support and < minimal subjective instability to promote return to baseline function. - MET 12/23/2024   9.) Patient will report ability to negotiate curbs w/out UE support and < minimal subjective instability to promote return to baseline function and improve ability to negotiate community environments. - PARTIALLY MET 12/23/2024   10.) Patient will report ability to attend grandchildrens' sporting events w/ LRAD and < minimal subjective instability to promote return to community participation. - PARTIALLY MET 12/23/2024

## 2025-02-27 ENCOUNTER — APPOINTMENT (OUTPATIENT)
Dept: PHYSICAL THERAPY | Facility: CLINIC | Age: 75
End: 2025-02-27
Payer: MEDICARE

## 2025-02-27 DIAGNOSIS — Z74.09 IMPAIRED FUNCTIONAL MOBILITY AND ENDURANCE: Primary | ICD-10-CM

## 2025-02-27 DIAGNOSIS — R26.89 IMPAIRMENT OF BALANCE: ICD-10-CM

## 2025-02-27 DIAGNOSIS — M62.89 PROXIMAL WEAKNESS OF EXTREMITY: ICD-10-CM

## 2025-02-27 DIAGNOSIS — R26.9 IMPAIRED GAIT: ICD-10-CM

## 2025-02-27 PROCEDURE — 97110 THERAPEUTIC EXERCISES: CPT | Mod: GP,KX | Performed by: PHYSICAL THERAPIST

## 2025-02-27 NOTE — PROGRESS NOTES
Physical Therapy    Physical Therapy Treatment    Patient Name: Jennifer Boogie  MRN: [id number]  Today's Date: 2/27/2025    Time Entry:   Time Calculation  Start Time: 1000  Stop Time: 1042  Time Calculation (min): 42 min     PT Therapeutic Procedures Time Entry  Therapeutic Exercise Time Entry: 40                   Insurance: [facility] A/B 90D  -AARP supplemental      Visit Number: 15 (4/12)  -Authorized Visits: 12 (1x/week)  -Authorized Date Range: 12/24/2024 - 3/24/2025    Assessment:   Session focused on progressing proximal LE strength and touching on SLS stability. Patient tolerated session well, requiring few verbal and tactile cues in order to achieve proper muscle activation, but showed great carryover for rest of the exercise. Patient with significant trendelenburg with LLE stance activities, requiring additional UE support in order to complete movements. At this time, patient continues to require skilled PT services to address ongoing impairments in functional activity tolerance, neuromuscular control, SLS stability, and gait mechanics to reduce fall risk and promote return to baseline function.    Supervising physical therapist was responsible for all clinical decision making and was present/provided supervision for entirety of session.       Plan:   Continue with POC.     Next Visit:   -Focus on back and hip mobility and strength   -Walking balance interventions    Current Problem  1. Impaired functional mobility and endurance        2. Proximal weakness of extremity        3. Impairment of balance        4. Impaired gait            Subjective    General  Patient presents to the clinic ambulatory w/out device and w/ no new reports from last session. States compliance w/ HEP and is w/out questions to concerns.       Objective     Treatments:  Therapeutic Exercise (14730): 40 minutes  To improve general LE strength/endurance:  -NuStep, x 5 min, lvl 4    To improve lumbar mobility:  -Seated PB roll-outs to L, x 15  "    To improve general proximal LE strength:  -Standing hip abduction with 1 # ankle weight, 2 x 15 R/L   Progressed 2nd set to 1.5# ankle weight, tactile cueing for proper muscle activation  -Standing hip extension with 1.5 # ankle weight, 2 x 15 R/L   -Step ups to 4\" box, 2 x 15 R/L - cueing to drive stance leg into box  -Toe taps to 4\" box with cone on top, 2 x 12 R/L   -Tap downs from 2\" box, 2 x 12 R/L - decreased confidence, required encouragement to complete  -Tap in front and behind 2\" box, 1 x 8 R/L stance leg      Current HEP:  Access Code: [email or web address]  URL: [email or web address]  Date: 12/24/2024  Prepared by: Leia Guadalupe    Exercises  - Romberg Stance Eyes Closed on Foam Pad  - 1 x daily - 7 x weekly - 3 sets - 30s hold  - Romberg Stance on Foam Pad with Head Rotation  - 1 x daily - 7 x weekly - 3 sets - 30s hold  - Romberg Stance with Head Nods on Foam Pad  - 1 x daily - 7 x weekly - 3 sets - 30s hold  - Standing Hip Hiking  - 1 x daily - 7 x weekly - 2 sets - 10 reps  - Side Stepping with Resistance at Ankles  - 1 x daily - 7 x weekly - 3 sets - 2 min hold    OP EDUCATION:   Provided education on the rational behind interventions performed in the context of ongoing symptoms. Patient verbalized understanding.    Goals:  By discharge,      1.) Patient will demonstrate independence with HEP to promote symptom relief and facilitate return to prior level of function. - ONGOING 12/23/2024   2.) Score on ABC Balance Scale will improve to >67% to indicate reduced fall risk and improved confidence related to balance tasks (Scores <67% indicate risk of falling in older adults). - NT 12/23/2024 d/t time constraints  3.) Time on 5xSTS will improve to <12s w/out instability upon standing to indicate improved functional LE strength and decreased risk of falls (Times >/= 12s = further need to assess fall risk in community dwelling older adults). - MET 12/23/2024   4.) Distance on 6MWT will " improve by 61m w/ more normalized BP response (</= 20/10 mmHg change) to indicate improvements in endurance and cardiovascular response. - ONGOING 12/23/2024   5.) Patient will demonstrate ability to complete all trials of mCTSIB w/ up to mod instability and for total duration of 30s each to indicate improvements in static balance and righting strategies and to reduce overall fall risk. - MET 12/23/2024   6.) Patient will demonstrate adequate ankle, hip, and reactive stepping strategies to reduce fall risk and promote safety of mobility tasks. - ONGOING 12/23/2024   7.) Patient will demonstrate ability to ambulate >500ft w/out device and w/ more normalized mechanics, including heel strike at IC and reduced lateral trunk lean during ipsilateral stance, to promote return to baseline function and improve safety of ambulatory tasks. - ONGOING 12/23/2024   8.) Patient will report ability to negotiate stairs w/ unilateral UE support and < minimal subjective instability to promote return to baseline function. - MET 12/23/2024   9.) Patient will report ability to negotiate curbs w/out UE support and < minimal subjective instability to promote return to baseline function and improve ability to negotiate community environments. - PARTIALLY MET 12/23/2024   10.) Patient will report ability to attend grandchildrens' sporting events w/ LRAD and < minimal subjective instability to promote return to community participation. - PARTIALLY MET 12/23/2024

## 2025-03-07 ENCOUNTER — APPOINTMENT (OUTPATIENT)
Dept: PHYSICAL THERAPY | Facility: CLINIC | Age: 75
End: 2025-03-07
Payer: MEDICARE

## 2025-03-07 DIAGNOSIS — R26.89 IMPAIRMENT OF BALANCE: ICD-10-CM

## 2025-03-07 DIAGNOSIS — R26.9 IMPAIRED GAIT: ICD-10-CM

## 2025-03-07 DIAGNOSIS — Z74.09 IMPAIRED FUNCTIONAL MOBILITY AND ENDURANCE: Primary | ICD-10-CM

## 2025-03-07 DIAGNOSIS — M62.89 PROXIMAL WEAKNESS OF EXTREMITY: ICD-10-CM

## 2025-03-07 PROCEDURE — 97110 THERAPEUTIC EXERCISES: CPT | Mod: GP | Performed by: PHYSICAL THERAPIST

## 2025-03-07 NOTE — PROGRESS NOTES
"Physical Therapy    Physical Therapy Treatment    Patient Name: Jennifer Boogie  MRN: [id number]  Today's Date: 3/7/2025    Time Entry:   Time Calculation  Start Time: 1000  Stop Time: 1043  Time Calculation (min): 43 min     PT Therapeutic Procedures Time Entry  Therapeutic Exercise Time Entry: 40                   Insurance: [facility] A/B 90D  -[facility] supplemental      Visit Number: 16 (5/12)  -Authorized Visits: 12 (1x/week)  -Authorized Date Range: 12/24/2024 - 3/24/2025    Assessment:   Session focused on progressing proximal LE strength and SLS stability. Patient tolerated session well, showing proper form with tactile cueing. Slightly decreased Trendelenburg was noted with core activation with ambulation. Increased encouragement was needed in order for patient to perform the tap downs from a 2\" box. At this time, patient continues to require skilled PT services to address ongoing impairments in functional activity tolerance, neuromuscular control, SLS stability, and gait mechanics to reduce fall risk and promote return to baseline function.    Supervising physical therapist was responsible for all clinical decision making and was present/provided supervision for entirety of session.       Plan:   Continue with POC.     Next Visit:   -Focus on back and hip mobility and strength   -Walking balance interventions    Current Problem  1. Impaired functional mobility and endurance        2. Proximal weakness of extremity        3. Impairment of balance        4. Impaired gait            Subjective    General  Patient presents to the clinic ambulatory w/out device and w/ no new reports from last session. States compliance w/ HEP and is w/out questions to concerns.       Objective     Treatments:  Therapeutic Exercise (98457): 40 minutes  To improve general LE strength/endurance:  -NuStep, x 5 min, lvl 4    To improve lumbar mobility:  -Seated PB roll-outs to L, x 15     To improve core activation w/ambulation   -Walking " "heartbeats w/ Red TB + horizontal shoulder abd, 2 x 150ft     To improve general proximal LE strength:  -Standing hip abduction with 1.5 # ankle weight, 2 x 15 R/L - tactile cues for proper form  -Standing hip extension with 1.5 # ankle weight, 2 x 15 R/L - tactile cues for proper form   -Step ups to 4\" box, 2 x 15 R/L - cueing to drive stance leg into box  -Toe taps to 4\" box with cone on top, 2 x 10 R/L - increased difficulty with LLE stance  -Tap downs from 2\" box, 2 x 12 R/L - decreased confidence, required encouragement to complete  -Tap in front and behind 2\" box, 1 x 8 R/L stance leg  -Walk over 4, 1\" hurdles, 5 x down <> back     Current HEP:  Access Code: [email or web address]  URL: [email or web address]  Date: 12/24/2024  Prepared by: Leia Guadalupe    Exercises  - Romberg Stance Eyes Closed on Foam Pad  - 1 x daily - 7 x weekly - 3 sets - 30s hold  - Romberg Stance on Foam Pad with Head Rotation  - 1 x daily - 7 x weekly - 3 sets - 30s hold  - Romberg Stance with Head Nods on Foam Pad  - 1 x daily - 7 x weekly - 3 sets - 30s hold  - Standing Hip Hiking  - 1 x daily - 7 x weekly - 2 sets - 10 reps  - Side Stepping with Resistance at Ankles  - 1 x daily - 7 x weekly - 3 sets - 2 min hold    OP EDUCATION:   Provided education on the rational behind interventions performed in the context of ongoing symptoms. Patient verbalized understanding.    Goals:  By discharge,      1.) Patient will demonstrate independence with HEP to promote symptom relief and facilitate return to prior level of function. - ONGOING 12/23/2024   2.) Score on ABC Balance Scale will improve to >67% to indicate reduced fall risk and improved confidence related to balance tasks (Scores <67% indicate risk of falling in older adults). - NT 12/23/2024 d/t time constraints  3.) Time on 5xSTS will improve to <12s w/out instability upon standing to indicate improved functional LE strength and decreased risk of falls (Times >/= 12s = " further need to assess fall risk in community dwelling older adults). - MET 12/23/2024   4.) Distance on 6MWT will improve by 61m w/ more normalized BP response (</= 20/10 mmHg change) to indicate improvements in endurance and cardiovascular response. - ONGOING 12/23/2024   5.) Patient will demonstrate ability to complete all trials of mCTSIB w/ up to mod instability and for total duration of 30s each to indicate improvements in static balance and righting strategies and to reduce overall fall risk. - MET 12/23/2024   6.) Patient will demonstrate adequate ankle, hip, and reactive stepping strategies to reduce fall risk and promote safety of mobility tasks. - ONGOING 12/23/2024   7.) Patient will demonstrate ability to ambulate >500ft w/out device and w/ more normalized mechanics, including heel strike at IC and reduced lateral trunk lean during ipsilateral stance, to promote return to baseline function and improve safety of ambulatory tasks. - ONGOING 12/23/2024   8.) Patient will report ability to negotiate stairs w/ unilateral UE support and < minimal subjective instability to promote return to baseline function. - MET 12/23/2024   9.) Patient will report ability to negotiate curbs w/out UE support and < minimal subjective instability to promote return to baseline function and improve ability to negotiate community environments. - PARTIALLY MET 12/23/2024   10.) Patient will report ability to attend grandchildrens' sporting events w/ LRAD and < minimal subjective instability to promote return to community participation. - PARTIALLY MET 12/23/2024

## 2025-03-13 ENCOUNTER — APPOINTMENT (OUTPATIENT)
Dept: PHYSICAL THERAPY | Facility: CLINIC | Age: 75
End: 2025-03-13
Payer: MEDICARE

## 2025-03-13 DIAGNOSIS — R26.9 IMPAIRED GAIT: ICD-10-CM

## 2025-03-13 DIAGNOSIS — M62.89 PROXIMAL WEAKNESS OF EXTREMITY: ICD-10-CM

## 2025-03-13 DIAGNOSIS — R26.89 IMPAIRMENT OF BALANCE: ICD-10-CM

## 2025-03-13 DIAGNOSIS — Z74.09 IMPAIRED FUNCTIONAL MOBILITY AND ENDURANCE: Primary | ICD-10-CM

## 2025-03-13 PROCEDURE — 97110 THERAPEUTIC EXERCISES: CPT | Mod: GP,KX | Performed by: PHYSICAL THERAPIST

## 2025-03-13 NOTE — PROGRESS NOTES
Physical Therapy    Physical Therapy Treatment    Patient Name: Jennifer Boogie  MRN: [id number]  Today's Date: 3/13/2025    Time Entry:   Time Calculation  Start Time: 1003  Stop Time: 1045  Time Calculation (min): 42 min     PT Therapeutic Procedures Time Entry  Therapeutic Exercise Time Entry: 38                   Insurance: [employer] A/B 90D  -[facility] supplemental      Visit Number: 17 (6/12)  -Authorized Visits: 12 (1x/week)  -Authorized Date Range: 12/24/2024 - 3/24/2025    Assessment:   Session focused on progressing core and proximal LE strength. Patient tolerated well and demonstrated proper form w/ all interventions after mild cueing. Still with some weakness in lateral hip musculature, but will assess efficacy of PT interventions during re-certification next session. At this time, patient continues to require skilled PT services to address ongoing impairments in functional activity tolerance, neuromuscular control, SLS stability, and gait mechanics to reduce fall risk and promote return to baseline function.    Supervising physical therapist was responsible for all clinical decision making and was present/provided supervision for entirety of session.       Plan:   Continue with POC.     Next Visit:   -Re-certification  -Focus on back and hip mobility and strength   -Walking balance interventions    Current Problem  1. Impaired functional mobility and endurance        2. Proximal weakness of extremity        3. Impairment of balance        4. Impaired gait            Subjective    General  Patient presents to the clinic ambulatory w/out device and w/ no new reports from last session. States compliance w/ HEP and is w/out questions to concerns.       Objective     Treatments:  Therapeutic Exercise (90626): 38 minutes  To improve general LE strength/endurance:  -NuStep, x 5 min, lvl 1 - to comply w/ cataract precautions    To improve dynamic core activation:  -Paloff isometric w/ green TB + LAT walk-outs, 2 x 10    To  "improve general proximal LE strength:  -LAT tap-ups to 12\" box + rolled up gait belt for extra height  -FWD step-ups to 4\" step + contralateral knee drive, 2 x 12 R/L - cueing to drive stance leg into step  -LAT step-ups to 4\" step + contralateral hip ABD, 2 x 12 R/L - cueing to drive stance leg into step    Current HEP:  Access Code: [email or web address]  URL: [email or web address]  Date: 12/24/2024  Prepared by: Leia Guadalupe    Exercises  - Romberg Stance Eyes Closed on Foam Pad  - 1 x daily - 7 x weekly - 3 sets - 30s hold  - Romberg Stance on Foam Pad with Head Rotation  - 1 x daily - 7 x weekly - 3 sets - 30s hold  - Romberg Stance with Head Nods on Foam Pad  - 1 x daily - 7 x weekly - 3 sets - 30s hold  - Standing Hip Hiking  - 1 x daily - 7 x weekly - 2 sets - 10 reps  - Side Stepping with Resistance at Ankles  - 1 x daily - 7 x weekly - 3 sets - 2 min hold    OP EDUCATION:   Provided education on the rational behind interventions performed in the context of ongoing symptoms. Patient verbalized understanding.    Goals:  By discharge,      1.) Patient will demonstrate independence with HEP to promote symptom relief and facilitate return to prior level of function. - ONGOING 12/23/2024   2.) Score on ABC Balance Scale will improve to >67% to indicate reduced fall risk and improved confidence related to balance tasks (Scores <67% indicate risk of falling in older adults). - NT 12/23/2024 d/t time constraints  3.) Time on 5xSTS will improve to <12s w/out instability upon standing to indicate improved functional LE strength and decreased risk of falls (Times >/= 12s = further need to assess fall risk in community dwelling older adults). - MET 12/23/2024   4.) Distance on 6MWT will improve by 61m w/ more normalized BP response (</= 20/10 mmHg change) to indicate improvements in endurance and cardiovascular response. - ONGOING 12/23/2024   5.) Patient will demonstrate ability to complete all trials of " mCTSIB w/ up to mod instability and for total duration of 30s each to indicate improvements in static balance and righting strategies and to reduce overall fall risk. - MET 12/23/2024   6.) Patient will demonstrate adequate ankle, hip, and reactive stepping strategies to reduce fall risk and promote safety of mobility tasks. - ONGOING 12/23/2024   7.) Patient will demonstrate ability to ambulate >500ft w/out device and w/ more normalized mechanics, including heel strike at IC and reduced lateral trunk lean during ipsilateral stance, to promote return to baseline function and improve safety of ambulatory tasks. - ONGOING 12/23/2024   8.) Patient will report ability to negotiate stairs w/ unilateral UE support and < minimal subjective instability to promote return to baseline function. - MET 12/23/2024   9.) Patient will report ability to negotiate curbs w/out UE support and < minimal subjective instability to promote return to baseline function and improve ability to negotiate community environments. - PARTIALLY MET 12/23/2024   10.) Patient will report ability to attend grandchildrens' sporting events w/ LRAD and < minimal subjective instability to promote return to community participation. - PARTIALLY MET 12/23/2024

## 2025-03-20 ENCOUNTER — APPOINTMENT (OUTPATIENT)
Dept: PHYSICAL THERAPY | Facility: CLINIC | Age: 75
End: 2025-03-20
Payer: MEDICARE

## 2025-03-20 DIAGNOSIS — Z74.09 IMPAIRED FUNCTIONAL MOBILITY AND ENDURANCE: Primary | ICD-10-CM

## 2025-03-20 DIAGNOSIS — M62.89 PROXIMAL WEAKNESS OF EXTREMITY: ICD-10-CM

## 2025-03-20 DIAGNOSIS — R26.89 IMPAIRMENT OF BALANCE: ICD-10-CM

## 2025-03-20 DIAGNOSIS — R26.9 IMPAIRED GAIT: ICD-10-CM

## 2025-03-20 PROCEDURE — 97530 THERAPEUTIC ACTIVITIES: CPT | Mod: GP,KX | Performed by: PHYSICAL THERAPIST

## 2025-03-20 NOTE — LETTER
March 20, 2025    Ric Baxter DO  [address] [facility] [address]    Patient: Jennifer Boogie   YOB: 1950   Date of Visit: 3/20/2025       Dear No referring provider defined for this encounter.    The attached plan of care is being sent to you because your patient’s medical reimbursement requires that you certify the plan of care. Your signature is required to allow uninterrupted insurance coverage.      You may indicate your approval by signing below and faxing this form back to us at Dept Fax: [phone].    Please call Dept: [phone] with any questions or concerns.    Thank you for this referral,        Leia Guadalupe PT  [facility] [address] [facility]  [address] [facility] [address]    Payer: Payor: MEDICARE / Plan: MEDICARE PART A AND B / Product Type: *No Product type* /                                                                         Date:     Dear Leia Guadalupe PT,     Re: Ms. Jennifer Boogie, MRN:[id number]    I certify that I have reviewed the attached plan of care and it is medically necessary for Ms. Jennifer Boogie (1950) who is under my care.          ______________________________________                    _________________  Provider name and credentials                                           Date and time                                                                                           Plan of Care 3/20/25   Effective from: 3/20/2025  Effective to: 6/18/2025    Plan ID: [id number]            Participants as of Finalize on 3/20/2025    Name Type Comments Contact Info    Ric Baxter DO Consulting Physician For [employer] re-certificaiton. Please sign at your earliest convenience. [phone]       Last Plan Note     Author: Leia Guadalupe PT Status: Sign when Signing Visit Last edited: 3/20/2025 10:00 AM       Physical Therapy    Physical Therapy Treatment (Re-Certification)    Patient Name: Jennifer Boogie  MRN: [id number]  Today's  Date: 3/20/2025    Time Entry:   Time Calculation  Start Time: 1000  Stop Time: 1045  Time Calculation (min): 45 min     PT Therapeutic Procedures Time Entry  Therapeutic Activity Time Entry: 38                   Insurance: [facility] A/B 90D  -[facility] supplemental      Visit Number: 18 (7/12)  -Authorized Visits: 12 (1x/week)  -Authorized Date Range: 12/24/2024 - 3/24/2025    Assessment:   Session focused on performing reassessment d/t nearing end of [facility] certification dates. Subjectively, patient endorses 70% improvement w/ ongoing deficits in ambulatory endurance and ability to negotiate curb steps w/out UE support. Score on ABC improved dramatically w/ patient reporting near 80% confidence vs 55% seen on IE. Objectively, distance on 6MWT improved from both previous attempts with patient achieving a distance of 15m further. BP response was still fairly elevated at end of test comparatively to previous attempts. Results of this test indicate that impaired strength was at least partially involved in endurance limitations, as 6MWT only improved once strength vs balance became a larger focus of sessions. Strength gains also likely contributed to more economical gait pattern lending to ability to ambulate for further distances. Gait appeared much steadier, more fluid, and w/ less Trendelenberg motions for first 2 minutes of walking indicating success of PT interventions thus far. Despite improvements, patient continues to demonstrate bilateral proximal weakness, gait impairments, balance deficits, and impaired endurance requiring ongoing skilled services. Patient has met 4/10 goals and is demonstrating strong progress towards the rest. Due to the above information, PT recommending extension of PT POC by 10 visits at 1x/week frequency to continue addressing goals.       Plan:   Continue with POC. Request extension of PT POC by 10 visits at 1x/week frequency. Goals updated, see below.    Next Visit:   -Proximal strength:  core/hips  -Reactive stepping  -Curb step negotiation    Current Problem  1. Impaired functional mobility and endurance        2. Proximal weakness of extremity        3. Impairment of balance        4. Impaired gait            Subjective    General  Patient presents to the clinic ambulatory w/out device and w/ reports of severe ankle pain 2 nights ago, but does not have pain anymore. Thinks she just slept on it wrong. Endorses 70% improvement since IE, w/ ongoing impairments in ambulatory endurance and stepping up/down curbs. States compliance w/ HEP and is w/out questions to concerns.     Objective   Gait:  Pt ambulates independently w/out device and w/ L Trendelberg/lateral trunk lean during ipsilateral stance, worsened w/ fatigue, short step lengths, overall improved fluidity and mechanics for first 2 minutes of 6-minute walk compared to previous reassessments    Outcome Measures:  Other Measures  6 min Walk Test: 604ft (Post-walk BP: 193/86 mmHg)  Activities - Specific Balance Confidence Scale: 1275    Treatments:  Therapeutic Activity (49603): 38 minutes  -Reassessment of 6MWT, see objective section  -Reassessment of gait, see objective section  -Reassessment of goals, see below  -Education: Provided education on the results of outcome measures, interpretation of scores, comparison to IE, and impact on future PT POC. Provided education on balance systems and importance of being able to recruit reactive stepping should the situation necessitate. Provided education on future interventions given improvements in ABC and 6MWT measures. Patient verbalized understanding.    Current HEP:  Access Code: [email or web address]  URL: [email or web address]  Date: 12/24/2024  Prepared by: Leia Guadalupe    Exercises  - Romberg Stance Eyes Closed on Foam Pad  - 1 x daily - 7 x weekly - 3 sets - 30s hold  - Romberg Stance on Foam Pad with Head Rotation  - 1 x daily - 7 x weekly - 3 sets - 30s hold  - Romberg Stance  with Head Nods on Foam Pad  - 1 x daily - 7 x weekly - 3 sets - 30s hold  - Standing Hip Hiking  - 1 x daily - 7 x weekly - 2 sets - 10 reps  - Side Stepping with Resistance at Ankles  - 1 x daily - 7 x weekly - 3 sets - 2 min hold    OP EDUCATION:   Provided education on the rational behind interventions performed in the context of ongoing symptoms. Patient verbalized understanding.    Goals:  By discharge,      1.) Patient will demonstrate independence with HEP to promote symptom relief and facilitate return to prior level of function. - ONGOING 3/20/2025  2.) Score on ABC Balance Scale will improve to >67% to indicate reduced fall risk and improved confidence related to balance tasks (Scores <67% indicate risk of falling in older adults). - MET 3/20/2025  3.) Time on 5xSTS will improve to <12s w/out instability upon standing to indicate improved functional LE strength and decreased risk of falls (Times >/= 12s = further need to assess fall risk in community dwelling older adults). - MET 12/23/2024   4.) Distance on 6MWT will improve by 61m w/ more normalized BP response (</= 20/10 mmHg change) to indicate improvements in endurance and cardiovascular response. - ONGOING 3/20/2025, positive progress seen, 15m further than IE   5.) Patient will demonstrate ability to complete all trials of mCTSIB w/ up to mod instability and for total duration of 30s each to indicate improvements in static balance and righting strategies and to reduce overall fall risk. - MET 12/23/2024   6.) Patient will demonstrate adequate ankle, hip, and reactive stepping strategies to reduce fall risk and promote safety of mobility tasks. - ONGOING 3/20/2025, unable to elicit LAT stepping, smaller posterior steps   7.) Patient will demonstrate ability to ambulate >500ft w/out device and w/ more normalized mechanics, including heel strike at IC and reduced lateral trunk lean during ipsilateral stance, to promote return to baseline function and  improve safety of ambulatory tasks. - ONGOING 3/20/2025, positive progress seen  8.) Patient will report ability to negotiate stairs w/ unilateral UE support and < minimal subjective instability to promote return to baseline function. - MET 12/23/2024   9.) Patient will report ability to negotiate curbs w/out UE support and < minimal subjective instability to promote return to baseline function and improve ability to negotiate community environments. - ONGOING 3/20/2025  10.) Patient will report ability to attend grandchildrens' sporting events w/ LRAD and < minimal subjective instability to promote return to community participation. - ONGOING 3/20/2025         Current Participants as of 3/20/2025    Name Type Comments Contact Info    Ric Baxter DO Consulting Physician For [employer] re-certificaiton. Please sign at your earliest convenience. [phone]    Signature pending

## 2025-03-20 NOTE — PROGRESS NOTES
Physical Therapy    Physical Therapy Treatment (Re-Certification)    Patient Name: Jennifer Boogie  MRN: [id number]  Today's Date: 3/20/2025    Time Entry:   Time Calculation  Start Time: 1000  Stop Time: 1045  Time Calculation (min): 45 min     PT Therapeutic Procedures Time Entry  Therapeutic Activity Time Entry: 38                   Insurance: [facility] A/B 90D  -[employer] supplemental      Visit Number: 18 (7/12)  -Authorized Visits: 12 (1x/week)  -Authorized Date Range: 12/24/2024 - 3/24/2025    Assessment:   Session focused on performing reassessment d/t nearing end of [facility] certification dates. Subjectively, patient endorses 70% improvement w/ ongoing deficits in ambulatory endurance and ability to negotiate curb steps w/out UE support. Score on ABC improved dramatically w/ patient reporting near 80% confidence vs 55% seen on IE. Objectively, distance on 6MWT improved from both previous attempts with patient achieving a distance of 15m further. BP response was still fairly elevated at end of test comparatively to previous attempts. Results of this test indicate that impaired strength was at least partially involved in endurance limitations, as 6MWT only improved once strength vs balance became a larger focus of sessions. Strength gains also likely contributed to more economical gait pattern lending to ability to ambulate for further distances. Gait appeared much steadier, more fluid, and w/ less Trendelenberg motions for first 2 minutes of walking indicating success of PT interventions thus far. Despite improvements, patient continues to demonstrate bilateral proximal weakness, gait impairments, balance deficits, and impaired endurance requiring ongoing skilled services. Patient has met 4/10 goals and is demonstrating strong progress towards the rest. Due to the above information, PT recommending extension of PT POC by 10 visits at 1x/week frequency to continue addressing goals.       Plan:   Continue with POC. Request  extension of PT POC by 10 visits at 1x/week frequency. Goals updated, see below.    Next Visit:   -Proximal strength: core/hips  -Reactive stepping  -Curb step negotiation    Current Problem  1. Impaired functional mobility and endurance        2. Proximal weakness of extremity        3. Impairment of balance        4. Impaired gait            Subjective    General  Patient presents to the clinic ambulatory w/out device and w/ reports of severe ankle pain 2 nights ago, but does not have pain anymore. Thinks she just slept on it wrong. Endorses 70% improvement since IE, w/ ongoing impairments in ambulatory endurance and stepping up/down curbs. States compliance w/ HEP and is w/out questions to concerns.     Objective   Gait:  Pt ambulates independently w/out device and w/ L Trendelberg/lateral trunk lean during ipsilateral stance, worsened w/ fatigue, short step lengths, overall improved fluidity and mechanics for first 2 minutes of 6-minute walk compared to previous reassessments    Outcome Measures:  Other Measures  6 min Walk Test: 604ft (Post-walk BP: 193/86 mmHg)  Activities - Specific Balance Confidence Scale: 1275    Treatments:  Therapeutic Activity (38070): 38 minutes  -Reassessment of 6MWT, see objective section  -Reassessment of gait, see objective section  -Reassessment of goals, see below  -Education: Provided education on the results of outcome measures, interpretation of scores, comparison to IE, and impact on future PT POC. Provided education on balance systems and importance of being able to recruit reactive stepping should the situation necessitate. Provided education on future interventions given improvements in ABC and 6MWT measures. Patient verbalized understanding.    Current HEP:  Access Code: [email or web address]  URL: [email or web address]  Date: 12/24/2024  Prepared by: Leia Guadalupe    Exercises  - Romberg Stance Eyes Closed on Foam Pad  - 1 x daily - 7 x weekly - 3 sets - 30s  hold  - Romberg Stance on Foam Pad with Head Rotation  - 1 x daily - 7 x weekly - 3 sets - 30s hold  - Romberg Stance with Head Nods on Foam Pad  - 1 x daily - 7 x weekly - 3 sets - 30s hold  - Standing Hip Hiking  - 1 x daily - 7 x weekly - 2 sets - 10 reps  - Side Stepping with Resistance at Ankles  - 1 x daily - 7 x weekly - 3 sets - 2 min hold    OP EDUCATION:   Provided education on the rational behind interventions performed in the context of ongoing symptoms. Patient verbalized understanding.    Goals:  By discharge,      1.) Patient will demonstrate independence with HEP to promote symptom relief and facilitate return to prior level of function. - ONGOING 3/20/2025  2.) Score on ABC Balance Scale will improve to >67% to indicate reduced fall risk and improved confidence related to balance tasks (Scores <67% indicate risk of falling in older adults). - MET 3/20/2025  3.) Time on 5xSTS will improve to <12s w/out instability upon standing to indicate improved functional LE strength and decreased risk of falls (Times >/= 12s = further need to assess fall risk in community dwelling older adults). - MET 12/23/2024   4.) Distance on 6MWT will improve by 61m w/ more normalized BP response (</= 20/10 mmHg change) to indicate improvements in endurance and cardiovascular response. - ONGOING 3/20/2025, positive progress seen, 15m further than IE   5.) Patient will demonstrate ability to complete all trials of mCTSIB w/ up to mod instability and for total duration of 30s each to indicate improvements in static balance and righting strategies and to reduce overall fall risk. - MET 12/23/2024   6.) Patient will demonstrate adequate ankle, hip, and reactive stepping strategies to reduce fall risk and promote safety of mobility tasks. - ONGOING 3/20/2025, unable to elicit LAT stepping, smaller posterior steps   7.) Patient will demonstrate ability to ambulate >500ft w/out device and w/ more normalized mechanics, including  heel strike at IC and reduced lateral trunk lean during ipsilateral stance, to promote return to baseline function and improve safety of ambulatory tasks. - ONGOING 3/20/2025, positive progress seen  8.) Patient will report ability to negotiate stairs w/ unilateral UE support and < minimal subjective instability to promote return to baseline function. - MET 12/23/2024   9.) Patient will report ability to negotiate curbs w/out UE support and < minimal subjective instability to promote return to baseline function and improve ability to negotiate community environments. - ONGOING 3/20/2025  10.) Patient will report ability to attend grandchildrens' sporting events w/ LRAD and < minimal subjective instability to promote return to community participation. - ONGOING 3/20/2025

## 2025-03-21 ENCOUNTER — APPOINTMENT (OUTPATIENT)
Dept: PODIATRY | Facility: CLINIC | Age: 75
End: 2025-03-21
Payer: MEDICARE

## 2025-03-28 ENCOUNTER — APPOINTMENT (OUTPATIENT)
Dept: PHYSICAL THERAPY | Facility: CLINIC | Age: 75
End: 2025-03-28
Payer: MEDICARE

## 2025-03-28 DIAGNOSIS — R26.9 IMPAIRED GAIT: ICD-10-CM

## 2025-03-28 DIAGNOSIS — Z74.09 IMPAIRED FUNCTIONAL MOBILITY AND ENDURANCE: Primary | ICD-10-CM

## 2025-03-28 DIAGNOSIS — R26.89 IMPAIRMENT OF BALANCE: ICD-10-CM

## 2025-03-28 DIAGNOSIS — M62.89 PROXIMAL WEAKNESS OF EXTREMITY: ICD-10-CM

## 2025-03-28 PROCEDURE — 97110 THERAPEUTIC EXERCISES: CPT | Mod: GP | Performed by: PHYSICAL THERAPIST

## 2025-03-28 NOTE — PROGRESS NOTES
"Physical Therapy    Physical Therapy Treatment    Patient Name: Jennifer Boogie  MRN: [id number]  Today's Date: 3/28/2025    Time Entry:   Time Calculation  Start Time: 0919  Stop Time: 1003  Time Calculation (min): 44 min     PT Therapeutic Procedures Time Entry  Therapeutic Exercise Time Entry: 40                   Insurance: [facility] A/B 90D  -[facility] supplemental      Visit Number: 18 (7/12)  -Authorized Visits: 12 (1x/week)  -Authorized Date Range: 12/24/2024 - 3/24/2025    Assessment:   Session focused on challenging LE and core strength as well as progressing patient towards curb and balance goals. Despite patient apprehensiveness, performed curb step negotiation very well and very minimal imbalance and progressed from CGA to SBA. Will continue to progress as tolerated and able. At this time, patient continues to require skilled PT services to address ongoing impairments in functional activity tolerance, neuromuscular control, SLS stability, and gait mechanics to reduce fall risk and promote return to baseline function.     Plan:   Continue with POC.    Next Visit:   -Proximal strength: core/hips  -Curb step negotiation  -Harness: reactive stepping    Current Problem  1. Impaired functional mobility and endurance        2. Proximal weakness of extremity        3. Impairment of balance        4. Impaired gait            Subjective    General  Patient presents to the clinic ambulatory w/out device and w/ no new reports from last session. States compliance w/ HEP and is w/out questions to concerns.     Objective     Treatments:  Therapeutic Exercise (29445): 40 minutes  To improve glute strength:  -FWD step-ups to 4\" step + contralateral knee drive, 2 x 12 R/L - cueing to drive stance leg into step  -LAT step-ups to 4\" step + contralateral hip ABD, 2 x 12 R/L - cueing to drive stance leg into step    To improve core strength:  -Standing horizontal ABD w/ red TB, x alt LE marching, 2 x 12 R/L    -LAT stepping w/ paloff " "isometric w/ green TB, x 5 R/L     To improve strength and balance confidence w/ curbs:   -FWD step-ups to 4\" step w/out UE support, x 10 R LE  -FWD > downward step to and from 4\" step w/out UE support, x 10    Current HEP:  Access Code: [email or web address]  URL: [email or web address]  Date: 12/24/2024  Prepared by: Leia Guadalupe    Exercises  - Romberg Stance Eyes Closed on Foam Pad  - 1 x daily - 7 x weekly - 3 sets - 30s hold  - Romberg Stance on Foam Pad with Head Rotation  - 1 x daily - 7 x weekly - 3 sets - 30s hold  - Romberg Stance with Head Nods on Foam Pad  - 1 x daily - 7 x weekly - 3 sets - 30s hold  - Standing Hip Hiking  - 1 x daily - 7 x weekly - 2 sets - 10 reps  - Side Stepping with Resistance at Ankles  - 1 x daily - 7 x weekly - 3 sets - 2 min hold    OP EDUCATION:   Provided education on the rational behind interventions performed in the context of ongoing symptoms. Patient verbalized understanding.    Goals:  By discharge,      1.) Patient will demonstrate independence with HEP to promote symptom relief and facilitate return to prior level of function. - ONGOING 3/20/2025  2.) Score on ABC Balance Scale will improve to >67% to indicate reduced fall risk and improved confidence related to balance tasks (Scores <67% indicate risk of falling in older adults). - MET 3/20/2025  3.) Time on 5xSTS will improve to <12s w/out instability upon standing to indicate improved functional LE strength and decreased risk of falls (Times >/= 12s = further need to assess fall risk in community dwelling older adults). - MET 12/23/2024   4.) Distance on 6MWT will improve by 61m w/ more normalized BP response (</= 20/10 mmHg change) to indicate improvements in endurance and cardiovascular response. - ONGOING 3/20/2025, positive progress seen, 15m further than IE   5.) Patient will demonstrate ability to complete all trials of mCTSIB w/ up to mod instability and for total duration of 30s each to indicate " improvements in static balance and righting strategies and to reduce overall fall risk. - MET 12/23/2024   6.) Patient will demonstrate adequate ankle, hip, and reactive stepping strategies to reduce fall risk and promote safety of mobility tasks. - ONGOING 3/20/2025, unable to elicit LAT stepping, smaller posterior steps   7.) Patient will demonstrate ability to ambulate >500ft w/out device and w/ more normalized mechanics, including heel strike at IC and reduced lateral trunk lean during ipsilateral stance, to promote return to baseline function and improve safety of ambulatory tasks. - ONGOING 3/20/2025, positive progress seen  8.) Patient will report ability to negotiate stairs w/ unilateral UE support and < minimal subjective instability to promote return to baseline function. - MET 12/23/2024   9.) Patient will report ability to negotiate curbs w/out UE support and < minimal subjective instability to promote return to baseline function and improve ability to negotiate community environments. - ONGOING 3/20/2025  10.) Patient will report ability to attend grandchildrens' sporting events w/ LRAD and < minimal subjective instability to promote return to community participation. - ONGOING 3/20/2025

## 2025-04-03 ENCOUNTER — APPOINTMENT (OUTPATIENT)
Dept: PHYSICAL THERAPY | Facility: CLINIC | Age: 75
End: 2025-04-03
Payer: MEDICARE

## 2025-04-03 DIAGNOSIS — R26.89 IMPAIRMENT OF BALANCE: ICD-10-CM

## 2025-04-03 DIAGNOSIS — M62.89 PROXIMAL WEAKNESS OF EXTREMITY: ICD-10-CM

## 2025-04-03 DIAGNOSIS — R26.9 IMPAIRED GAIT: ICD-10-CM

## 2025-04-03 DIAGNOSIS — Z74.09 IMPAIRED FUNCTIONAL MOBILITY AND ENDURANCE: Primary | ICD-10-CM

## 2025-04-08 ENCOUNTER — APPOINTMENT (OUTPATIENT)
Dept: PHYSICAL THERAPY | Facility: CLINIC | Age: 75
End: 2025-04-08
Payer: MEDICARE

## 2025-04-08 DIAGNOSIS — R26.9 IMPAIRED GAIT: ICD-10-CM

## 2025-04-08 DIAGNOSIS — M62.89 PROXIMAL WEAKNESS OF EXTREMITY: ICD-10-CM

## 2025-04-08 DIAGNOSIS — R26.89 IMPAIRMENT OF BALANCE: ICD-10-CM

## 2025-04-08 DIAGNOSIS — Z74.09 IMPAIRED FUNCTIONAL MOBILITY AND ENDURANCE: Primary | ICD-10-CM

## 2025-04-08 PROCEDURE — 97110 THERAPEUTIC EXERCISES: CPT | Mod: GP,KX | Performed by: PHYSICAL THERAPIST

## 2025-04-08 NOTE — PROGRESS NOTES
Physical Therapy    Physical Therapy Treatment    Patient Name: Jennifer Boogie  MRN: [id number]  Today's Date: 4/8/2025    Time Entry:   Time Calculation  Start Time: 1048  Stop Time: 1130  Time Calculation (min): 42 min     PT Therapeutic Procedures Time Entry  Therapeutic Exercise Time Entry: 40                   Insurance: [facility] A/B 90D  -[facility] supplemental      Visit Number: 19 (1/10)  -Authorized Visits: 10  -Authorized Date Range: 3/20/2025 - 6/18/2025    Assessment:   Session focused on challenging LE and core strength as well as progressing patient towards curb and balance goals. Patient tolerated session very well despite feeling low energy due to recent illness. Able to progress step height during strengthening w/ minimal difficulty. Also demonstrated improved upright form during when stepping up with L LE which is historically weaker LE. This indicates improving LE strength. Much improved control and active weight shift during cone tapping exercise compared to what has been seen previously. Overall, patient appears to be progressing towards goals. Despite improvements, patient continues to demonstrate impaired activity tolerance and impaired gait mechanics indicating need for ongoing PT services. At this time, patient continues to require skilled PT services to address ongoing impairments in functional activity tolerance, neuromuscular control, SLS stability, and gait mechanics to reduce fall risk and promote return to baseline function.     Plan:   Continue with POC.    Next Visit:   -Proximal strength: core/hips  -Curb step negotiation  -Harness: reactive stepping    Current Problem  1. Impaired functional mobility and endurance        2. Proximal weakness of extremity        3. Impairment of balance        4. Impaired gait            Subjective    General  Patient presents to the clinic ambulatory w/out device and w/ no new reports from last session. States compliance w/ HEP and is w/out questions to  "concerns.     Objective     Treatments:  Therapeutic Exercise (62866): 40 minutes  To improve glute/core strength:  -NuStep, x 5 min, lvl 5  -LAT step over (2\" bolster) w/ red TB around ankles, 2 x 15 R/L  -FWD step-ups to 6\" step + contralateral knee drive w/ red TB, 2 x 10 R/L - cueing to drive stance leg into step  -Hip ABD isometric + CW/CCW circles w/ contralateral LE off step, 2 x 10 ea direction R/L - cueing to drive stance leg into step  -Standing horizontal ABD w/ red TB + alt LE marching, 2 x 12 R/L     -Progressed to tapping 4\" cones, x 10 R/L - good control    Current HEP:  Access Code: [email or web address]  URL: [email or web address]  Date: 12/24/2024  Prepared by: Leia Guadalupe    Exercises  - Romberg Stance Eyes Closed on Foam Pad  - 1 x daily - 7 x weekly - 3 sets - 30s hold  - Romberg Stance on Foam Pad with Head Rotation  - 1 x daily - 7 x weekly - 3 sets - 30s hold  - Romberg Stance with Head Nods on Foam Pad  - 1 x daily - 7 x weekly - 3 sets - 30s hold  - Standing Hip Hiking  - 1 x daily - 7 x weekly - 2 sets - 10 reps  - Side Stepping with Resistance at Ankles  - 1 x daily - 7 x weekly - 3 sets - 2 min hold    OP EDUCATION:   Provided education on the rational behind interventions performed in the context of ongoing symptoms. Patient verbalized understanding.    Goals:  By discharge,      1.) Patient will demonstrate independence with HEP to promote symptom relief and facilitate return to prior level of function. - ONGOING 3/20/2025  2.) Score on ABC Balance Scale will improve to >67% to indicate reduced fall risk and improved confidence related to balance tasks (Scores <67% indicate risk of falling in older adults). - MET 3/20/2025  3.) Time on 5xSTS will improve to <12s w/out instability upon standing to indicate improved functional LE strength and decreased risk of falls (Times >/= 12s = further need to assess fall risk in community dwelling older adults). - MET 12/23/2024   4.) " Distance on 6MWT will improve by 61m w/ more normalized BP response (</= 20/10 mmHg change) to indicate improvements in endurance and cardiovascular response. - ONGOING 3/20/2025, positive progress seen, 15m further than IE   5.) Patient will demonstrate ability to complete all trials of mCTSIB w/ up to mod instability and for total duration of 30s each to indicate improvements in static balance and righting strategies and to reduce overall fall risk. - MET 12/23/2024   6.) Patient will demonstrate adequate ankle, hip, and reactive stepping strategies to reduce fall risk and promote safety of mobility tasks. - ONGOING 3/20/2025, unable to elicit LAT stepping, smaller posterior steps   7.) Patient will demonstrate ability to ambulate >500ft w/out device and w/ more normalized mechanics, including heel strike at IC and reduced lateral trunk lean during ipsilateral stance, to promote return to baseline function and improve safety of ambulatory tasks. - ONGOING 3/20/2025, positive progress seen  8.) Patient will report ability to negotiate stairs w/ unilateral UE support and < minimal subjective instability to promote return to baseline function. - MET 12/23/2024   9.) Patient will report ability to negotiate curbs w/out UE support and < minimal subjective instability to promote return to baseline function and improve ability to negotiate community environments. - ONGOING 3/20/2025  10.) Patient will report ability to attend grandchildrens' sporting events w/ LRAD and < minimal subjective instability to promote return to community participation. - ONGOING 3/20/2025

## 2025-04-10 ENCOUNTER — APPOINTMENT (OUTPATIENT)
Dept: PHYSICAL THERAPY | Facility: CLINIC | Age: 75
End: 2025-04-10
Payer: MEDICARE

## 2025-04-10 DIAGNOSIS — R26.89 IMPAIRMENT OF BALANCE: ICD-10-CM

## 2025-04-10 DIAGNOSIS — Z74.09 IMPAIRED FUNCTIONAL MOBILITY AND ENDURANCE: Primary | ICD-10-CM

## 2025-04-10 DIAGNOSIS — M62.89 PROXIMAL WEAKNESS OF EXTREMITY: ICD-10-CM

## 2025-04-10 DIAGNOSIS — R26.9 IMPAIRED GAIT: ICD-10-CM

## 2025-04-16 ENCOUNTER — APPOINTMENT (OUTPATIENT)
Dept: PODIATRY | Facility: CLINIC | Age: 75
End: 2025-04-16
Payer: MEDICARE

## 2025-04-17 ENCOUNTER — APPOINTMENT (OUTPATIENT)
Dept: PHYSICAL THERAPY | Facility: CLINIC | Age: 75
End: 2025-04-17
Payer: MEDICARE

## 2025-04-17 DIAGNOSIS — R26.89 IMPAIRMENT OF BALANCE: ICD-10-CM

## 2025-04-17 DIAGNOSIS — R26.9 IMPAIRED GAIT: ICD-10-CM

## 2025-04-17 DIAGNOSIS — Z74.09 IMPAIRED FUNCTIONAL MOBILITY AND ENDURANCE: Primary | ICD-10-CM

## 2025-04-17 DIAGNOSIS — M62.89 PROXIMAL WEAKNESS OF EXTREMITY: ICD-10-CM

## 2025-04-17 PROCEDURE — 97112 NEUROMUSCULAR REEDUCATION: CPT | Mod: GP,KX | Performed by: PHYSICAL THERAPIST

## 2025-04-17 PROCEDURE — 97110 THERAPEUTIC EXERCISES: CPT | Mod: GP,KX | Performed by: PHYSICAL THERAPIST

## 2025-04-17 NOTE — PROGRESS NOTES
Physical Therapy    Physical Therapy Treatment    Patient Name: Jennifer Boogie  MRN: [id number]  Today's Date: 4/17/2025    Time Entry:   Time Calculation  Start Time: 1047  Stop Time: 1130  Time Calculation (min): 43 min     PT Therapeutic Procedures Time Entry  Neuromuscular Re-Education Time Entry: 25  Therapeutic Exercise Time Entry: 13                   Insurance: [facility] A/B 90D  -[employer] supplemental      Visit Number: 21 (3/10)  -Authorized Visits: 10  -Authorized Date Range: 3/20/2025 - 6/18/2025    Assessment:   Session focused on challenging LE and core strength as well as progressing patient towards curb and balance goals. Patient demonstrated strong ability to negotiate curb step in [place] harness w/ PT SUP, no physical assist, and in middle of the gym. This is huge achievement, as up until today, patient has been fearful of performing curbs unless placed directly next to hand support. Patient was w/out balance loss. Overall, patient appears to be progressing towards goals. Despite improvements, patient continues to demonstrate impaired activity tolerance and impaired gait mechanics indicating need for ongoing PT services. At this time, patient continues to require skilled PT services to address ongoing impairments in functional activity tolerance, neuromuscular control, SLS stability, and gait mechanics to reduce fall risk and promote return to baseline function.     Plan:   Continue with POC.    Next Visit:   -Proximal strength: core/hips  -Curb step negotiation  -Harness: reactive stepping    Current Problem  1. Impaired functional mobility and endurance        2. Proximal weakness of extremity        3. Impairment of balance        4. Impaired gait            Subjective    General  Patient presents to the clinic ambulatory w/out device and w/ no new reports from last session. States compliance w/ HEP and is w/out questions to concerns.     Objective     Treatments:  Therapeutic Exercise (72960): 13 minutes  To  "improve glute/core strength:  -NuStep, x 5 min, lvl 6  -LAT step over (2\" bolsters) w/ red TB around ankles + light UE support, 2 x 1:30 min  -Hip ABD isometric + CW/CCW circles w/ contralateral LE off step, 2 x 10 ea direction R/L - cueing to drive stance leg into step  -Standing horizontal ABD w/ red TB + tapping 4\" cones, 2 x 10 R/L      Neuromuscular Re-Education (30305): 25 minutes  To improve dynamic stability w/ curb step negotiation:  In [place] harness:  -FWD step-up and over 4\" box R LE leading, x 8 w/ B UE support on harness strap, x 8 w/out UE support    -Progressed to 6\" step w/ R LE leading, x 8, x 3    Current HEP:  Access Code: [email or web address]  URL: [email or web address]  Date: 12/24/2024  Prepared by: Leia Guadalupe    Exercises  - Romberg Stance Eyes Closed on Foam Pad  - 1 x daily - 7 x weekly - 3 sets - 30s hold  - Romberg Stance on Foam Pad with Head Rotation  - 1 x daily - 7 x weekly - 3 sets - 30s hold  - Romberg Stance with Head Nods on Foam Pad  - 1 x daily - 7 x weekly - 3 sets - 30s hold  - Standing Hip Hiking  - 1 x daily - 7 x weekly - 2 sets - 10 reps  - Side Stepping with Resistance at Ankles  - 1 x daily - 7 x weekly - 3 sets - 2 min hold    OP EDUCATION:   Provided education on the rational behind interventions performed in the context of ongoing symptoms. Patient verbalized understanding.    Goals:  By discharge,      1.) Patient will demonstrate independence with HEP to promote symptom relief and facilitate return to prior level of function. - ONGOING 3/20/2025  2.) Score on ABC Balance Scale will improve to >67% to indicate reduced fall risk and improved confidence related to balance tasks (Scores <67% indicate risk of falling in older adults). - MET 3/20/2025  3.) Time on 5xSTS will improve to <12s w/out instability upon standing to indicate improved functional LE strength and decreased risk of falls (Times >/= 12s = further need to assess fall risk in community " dwelling older adults). - MET 12/23/2024   4.) Distance on 6MWT will improve by 61m w/ more normalized BP response (</= 20/10 mmHg change) to indicate improvements in endurance and cardiovascular response. - ONGOING 3/20/2025, positive progress seen, 15m further than IE   5.) Patient will demonstrate ability to complete all trials of mCTSIB w/ up to mod instability and for total duration of 30s each to indicate improvements in static balance and righting strategies and to reduce overall fall risk. - MET 12/23/2024   6.) Patient will demonstrate adequate ankle, hip, and reactive stepping strategies to reduce fall risk and promote safety of mobility tasks. - ONGOING 3/20/2025, unable to elicit LAT stepping, smaller posterior steps   7.) Patient will demonstrate ability to ambulate >500ft w/out device and w/ more normalized mechanics, including heel strike at IC and reduced lateral trunk lean during ipsilateral stance, to promote return to baseline function and improve safety of ambulatory tasks. - ONGOING 3/20/2025, positive progress seen  8.) Patient will report ability to negotiate stairs w/ unilateral UE support and < minimal subjective instability to promote return to baseline function. - MET 12/23/2024   9.) Patient will report ability to negotiate curbs w/out UE support and < minimal subjective instability to promote return to baseline function and improve ability to negotiate community environments. - ONGOING 3/20/2025  10.) Patient will report ability to attend grandchildrens' sporting events w/ LRAD and < minimal subjective instability to promote return to community participation. - ONGOING 3/20/2025

## 2025-04-23 ENCOUNTER — APPOINTMENT (OUTPATIENT)
Dept: PHYSICAL THERAPY | Facility: CLINIC | Age: 75
End: 2025-04-23
Payer: MEDICARE

## 2025-04-23 DIAGNOSIS — Z74.09 IMPAIRED FUNCTIONAL MOBILITY AND ENDURANCE: Primary | ICD-10-CM

## 2025-04-23 DIAGNOSIS — R26.9 IMPAIRED GAIT: ICD-10-CM

## 2025-04-23 DIAGNOSIS — R26.89 IMPAIRMENT OF BALANCE: ICD-10-CM

## 2025-04-23 DIAGNOSIS — M62.89 PROXIMAL WEAKNESS OF EXTREMITY: ICD-10-CM

## 2025-04-23 PROCEDURE — 97110 THERAPEUTIC EXERCISES: CPT | Mod: GP | Performed by: PHYSICAL THERAPIST

## 2025-04-23 PROCEDURE — 97112 NEUROMUSCULAR REEDUCATION: CPT | Mod: GP | Performed by: PHYSICAL THERAPIST

## 2025-04-23 NOTE — PROGRESS NOTES
Physical Therapy    Physical Therapy Treatment    Patient Name: Jennifer Boogie  MRN: [id number]  Today's Date: 4/23/2025    Time Entry:   Time Calculation  Start Time: 1003  Stop Time: 1045  Time Calculation (min): 42 min     PT Therapeutic Procedures Time Entry  Neuromuscular Re-Education Time Entry: 15  Therapeutic Exercise Time Entry: 26                   Insurance: [facility] A/B 90D  -[employer] supplemental      Visit Number: 22 (4/10)  -Authorized Visits: 10  -Authorized Date Range: 3/20/2025 - 6/18/2025    Assessment:   Session focused on challenging LE and core strength as well as progressing patient towards curb and balance goals. Patient demonstrated ability to perform curb step w/ SUP A at most in middle of gym w/out security of having wall or UE support nearby. No evidence for imbalance, just LE fatigue which was to be expected. Patient demonstrated safe ability to negotiate curb step w/out UE support or assistance from PT. Much improved pelvic activation observed during strengthening interventions today. Patient w/ great progress towards goals, but still requiring skilled PT services to address ongoing impairments in LE strength, NMR, and curb step negotiation. At this time, patient continues to require skilled PT services to address ongoing impairments in functional activity tolerance, neuromuscular control, SLS stability, and gait mechanics to reduce fall risk and promote return to baseline function.     Plan:   Continue with POC.    Next Visit:   -Proximal strength: core/hips  -Curb step negotiation - go outside  -Harness: reactive stepping    Current Problem  1. Impaired functional mobility and endurance        2. Proximal weakness of extremity        3. Impairment of balance        4. Impaired gait            Subjective    General  Patient presents to the clinic ambulatory w/out device and w/ no new reports from last session. States compliance w/ HEP and is w/out questions to concerns.     Objective  "    Treatments:  Therapeutic Exercise (32221): 26 minutes  To improve glute/core strength:  -NuStep, x 8 min, lvl 6  -LAT step over (4\" bolsters) w/ yellow TB around ankles + light UE support, x 1:30 min R/L  -Hip ABD isometric + ABD w/ yellow TB w/ contralateral LE off step, 2 x 12 R/L - cueing to drive stance leg into step  -SLS + alt LE taps up to 4\" cones, x 10 ea R/L    -Progressed to cross-body tapping, x 10 ea - much improved weight shift and control     Neuromuscular Re-Education (82620): 15 minutes  To improve dynamic stability w/ curb step negotiation:  -FWD step-up and over 4\" box R LE leading, 2 x 10 w/ unilateral UE support on thigh  -FWD step-up and over 2\" box + Airex on top, x 10 w/ light fingertip support   -Performed x 2 w/out any UE support, no imbalance    Current HEP:  Access Code: [email or web address]  URL: [email or web address]  Date: 12/24/2024  Prepared by: Leia Guadalupe    Exercises  - Romberg Stance Eyes Closed on Foam Pad  - 1 x daily - 7 x weekly - 3 sets - 30s hold  - Romberg Stance on Foam Pad with Head Rotation  - 1 x daily - 7 x weekly - 3 sets - 30s hold  - Romberg Stance with Head Nods on Foam Pad  - 1 x daily - 7 x weekly - 3 sets - 30s hold  - Standing Hip Hiking  - 1 x daily - 7 x weekly - 2 sets - 10 reps  - Side Stepping with Resistance at Ankles  - 1 x daily - 7 x weekly - 3 sets - 2 min hold    OP EDUCATION:   Provided education on the rational behind interventions performed in the context of ongoing symptoms. Patient verbalized understanding.    Goals:  By discharge,      1.) Patient will demonstrate independence with HEP to promote symptom relief and facilitate return to prior level of function. - ONGOING 3/20/2025  2.) Score on ABC Balance Scale will improve to >67% to indicate reduced fall risk and improved confidence related to balance tasks (Scores <67% indicate risk of falling in older adults). - MET 3/20/2025  3.) Time on 5xSTS will improve to <12s w/out " instability upon standing to indicate improved functional LE strength and decreased risk of falls (Times >/= 12s = further need to assess fall risk in community dwelling older adults). - MET 12/23/2024   4.) Distance on 6MWT will improve by 61m w/ more normalized BP response (</= 20/10 mmHg change) to indicate improvements in endurance and cardiovascular response. - ONGOING 3/20/2025, positive progress seen, 15m further than IE   5.) Patient will demonstrate ability to complete all trials of mCTSIB w/ up to mod instability and for total duration of 30s each to indicate improvements in static balance and righting strategies and to reduce overall fall risk. - MET 12/23/2024   6.) Patient will demonstrate adequate ankle, hip, and reactive stepping strategies to reduce fall risk and promote safety of mobility tasks. - ONGOING 3/20/2025, unable to elicit LAT stepping, smaller posterior steps   7.) Patient will demonstrate ability to ambulate >500ft w/out device and w/ more normalized mechanics, including heel strike at IC and reduced lateral trunk lean during ipsilateral stance, to promote return to baseline function and improve safety of ambulatory tasks. - ONGOING 3/20/2025, positive progress seen  8.) Patient will report ability to negotiate stairs w/ unilateral UE support and < minimal subjective instability to promote return to baseline function. - MET 12/23/2024   9.) Patient will report ability to negotiate curbs w/out UE support and < minimal subjective instability to promote return to baseline function and improve ability to negotiate community environments. - ONGOING 3/20/2025  10.) Patient will report ability to attend grandchildrens' sporting events w/ LRAD and < minimal subjective instability to promote return to community participation. - ONGOING 3/20/2025

## 2025-04-30 ENCOUNTER — APPOINTMENT (OUTPATIENT)
Dept: PHYSICAL THERAPY | Facility: CLINIC | Age: 75
End: 2025-04-30
Payer: MEDICARE

## 2025-04-30 DIAGNOSIS — Z74.09 IMPAIRED FUNCTIONAL MOBILITY AND ENDURANCE: Primary | ICD-10-CM

## 2025-04-30 DIAGNOSIS — R26.9 IMPAIRED GAIT: ICD-10-CM

## 2025-04-30 DIAGNOSIS — M62.89 PROXIMAL WEAKNESS OF EXTREMITY: ICD-10-CM

## 2025-04-30 DIAGNOSIS — R26.89 IMPAIRMENT OF BALANCE: ICD-10-CM

## 2025-04-30 PROCEDURE — 97530 THERAPEUTIC ACTIVITIES: CPT | Mod: GP,KX | Performed by: PHYSICAL THERAPIST

## 2025-04-30 NOTE — PROGRESS NOTES
Physical Therapy    Physical Therapy Treatment (Progress Note)    Patient Name: Jennifer Boogie  MRN: [id number]  Today's Date: 4/30/2025    Time Entry:   Time Calculation  Start Time: 0918  Stop Time: 0945  Time Calculation (min): 27 min     PT Therapeutic Procedures Time Entry  Therapeutic Activity Time Entry: 25                   Insurance: [facility] A/B 90D  -[employer] supplemental      Visit Number: 23 (5/10)  -Authorized Visits: 10  -Authorized Date Range: 3/20/2025 - 6/18/2025    Assessment:   Session focused on performing reassessment d/t patient-reported improvements in balance and strength. Subjectively, patient endorses improved confidence w/ curb step negotiation and navigating busier community environments. Endorses feeling as though she has gained valuable tools necessary for her to continue working on goals on her own and checking back in if necessary. Objectively, distance on 6MWT largely similar to previous reassessment. Likely somewhat impacted by ongoing sciatic pain, but distance still further than IE and previous reassessments. Further progress in this realm unlikely d/t minimal progress observed thus far. Patient has demonstrated safe mechanics w/ curb step negotiation and significantly improved confidence relating to this task. Still demonstrating hesitancy which increases fall risk and inconsistency with active weight shift and single-leg stance. Patient has met 7/10 goals and is demonstrating progress towards all others. Through collaborative conversation w/ patient, decided to take break to trial self-management of condition using strategies and exercises developed in PT. Patient to return by 6/18 if further work necessary. PT provided all pertinent education regarding return to clinic and provided updated HEP handout. Patient in agreement w/ plan and left the clinic w/ no outstanding questions.     Plan:   Pause PT POC to trial self-management. Patient to return by 6/18 if necessary.    Current  Problem  1. Impaired functional mobility and endurance        2. Proximal weakness of extremity        3. Impairment of balance        4. Impaired gait            Subjective    General  Patient presents to the clinic ambulatory w/out device and w/ reports of flare-up of sciatica that is causing some pain in L LE today. Reports improved confidence w/ curbs and negotiating athletic-type environments compared to IE. Feels as though she has learned valuable tools to transition to home program and trial out time on her own. States compliance w/ HEP and is w/out questions to concerns.     Objective     Treatments:  Therapeutic Activity (68170): 25 minutes  -Reassessment of 6MWT, see objective section  -Reassessment of goals, see below   -Updated HEP based on progress seen and ongoing impairments, see below  -Education: Provided education regarding results of reassessment, interpretation of outcome measures, comparison to IE/previous reassessments, and impact on future PT POC.   -Education: Transition to home program instructions were provided as follows, 1.) Perform maintenance HEP 3-4x every week to maintain symptomatic improvements, 2.) Reach out to PCP if symptoms come back or worsen to obtain new script for PT services, 3.) Reach out to primary PT via email or phone w/ any questions or concerns, 4.) Return to clinic by 6/18 if needed for recurrence of symptoms. Patient verbalized understanding.    Outcome Measures:  Other Measures  6 min Walk Test: 588ft    Current HEP:  Access Code: [email or web address]  URL: [email or web address]  Date: 04/30/2025  Prepared by: Leia Guadalupe    Exercises  - Side Stepping with Resistance at Feet  - 1 x daily - 7 x weekly - 3 sets - 2 min hold  - Standing Hip Hiking  - 1 x daily - 7 x weekly - 3 sets - 12 reps  - Half Tandem Stance with Eyes Closed on Foam Pad  - 1 x daily - 7 x weekly - 3 sets - 30s hold  - Romberg Stance on Foam Pad with Head Rotation  - 1 x daily - 7 x  weekly - 3 sets - 30s hold  - Romberg Stance with Head Nods on Foam Pad  - 1 x daily - 7 x weekly - 3 sets - 30s hold    OP EDUCATION:   Provided education on the rational behind interventions performed in the context of ongoing symptoms. Patient verbalized understanding.    Goals:  By discharge,      1.) Patient will demonstrate independence with HEP to promote symptom relief and facilitate return to prior level of function. - MET 4/30/2025  2.) Score on ABC Balance Scale will improve to >67% to indicate reduced fall risk and improved confidence related to balance tasks (Scores <67% indicate risk of falling in older adults). - MET 3/20/2025  3.) Time on 5xSTS will improve to <12s w/out instability upon standing to indicate improved functional LE strength and decreased risk of falls (Times >/= 12s = further need to assess fall risk in community dwelling older adults). - MET 12/23/2024   4.) Distance on 6MWT will improve by 61m w/ more normalized BP response (</= 20/10 mmHg change) to indicate improvements in endurance and cardiovascular response. - ONGOING 4/30/2025  5.) Patient will demonstrate ability to complete all trials of mCTSIB w/ up to mod instability and for total duration of 30s each to indicate improvements in static balance and righting strategies and to reduce overall fall risk. - MET 12/23/2024   6.) Patient will demonstrate adequate ankle, hip, and reactive stepping strategies to reduce fall risk and promote safety of mobility tasks. - DISCONTINUED 4/30/2025 d/t patient discomfort  7.) Patient will demonstrate ability to ambulate >500ft w/out device and w/ more normalized mechanics, including heel strike at IC and reduced lateral trunk lean during ipsilateral stance, to promote return to baseline function and improve safety of ambulatory tasks. - ONGOING 4/30/2025  8.) Patient will report ability to negotiate stairs w/ unilateral UE support and < minimal subjective instability to promote return to  baseline function. - MET 12/23/2024   9.) Patient will report ability to negotiate curbs w/out UE support and < minimal subjective instability to promote return to baseline function and improve ability to negotiate community environments. - MET 4/30/2025  10.) Patient will report ability to attend grandchildrens' sporting events w/ LRAD and < minimal subjective instability to promote return to community participation. - MET 4/30/2025

## 2025-05-01 ENCOUNTER — APPOINTMENT (OUTPATIENT)
Dept: PODIATRY | Facility: CLINIC | Age: 75
End: 2025-05-01
Payer: MEDICARE

## 2025-05-01 DIAGNOSIS — M67.471 GANGLION, RIGHT ANKLE AND FOOT: Primary | ICD-10-CM

## 2025-05-01 DIAGNOSIS — M21.961 FOOT DEFORMITY, BILATERAL: ICD-10-CM

## 2025-05-01 DIAGNOSIS — M21.962 FOOT DEFORMITY, BILATERAL: ICD-10-CM

## 2025-05-01 PROCEDURE — 1160F RVW MEDS BY RX/DR IN RCRD: CPT | Performed by: PODIATRIST

## 2025-05-01 PROCEDURE — 99203 OFFICE O/P NEW LOW 30 MIN: CPT | Performed by: PODIATRIST

## 2025-05-01 PROCEDURE — 1036F TOBACCO NON-USER: CPT | Performed by: PODIATRIST

## 2025-05-01 PROCEDURE — 1159F MED LIST DOCD IN RCRD: CPT | Performed by: PODIATRIST

## 2025-05-01 NOTE — PROGRESS NOTES
History Of Present Illness  Jennifer Boogie is a 75 y.o. female presenting with chief complaint of: b/l foot pain.     PCP Seema Sterling MD  Last visit 4/16/25     Past Medical History  She has a past medical history of A-fib (Multi) and Hypertension.    Surgical History  She has a past surgical history that includes Other surgical history (08/03/2020); Other surgical history (08/03/2020); Other surgical history (08/03/2020); Other surgical history (08/03/2020); Other surgical history (08/03/2020); and Other surgical history (08/03/2020).     Social History  She reports that she has never smoked. She has never used smokeless tobacco. She reports that she does not currently use alcohol. She reports that she does not currently use drugs.    Family History  Family History[1]     Allergies  Patient has no known allergies.    Medications  Current Medications[2]    Review of Systems    REVIEW OF SYSTEMS  GENERAL:  Negative for malaise, significant weight loss, fever  CARDIOVASCULAR: leg swelling   MUSCULOSKELETAL:  Negative for joint pain or swelling, back pain, and muscle pain.  SKIN:  Negative for lesions, rash, and itching  PSYCH:  Negative for sleep disturbance, mood disorder and recent psychosocial stressors  NEURO: Negative, denies any burning, tingling or numbness     Objective:   Vasc: DP and PT pulses are palpable bilateral.  CFT is less than 3 seconds bilateral.  Skin temperature is warm to cool proximal to distal bilateral.  Surrounding edema    Neuro:  Light touch is intact to the foot bilateral.  Protective sensation is intact to the foot when tested with the 5.07 SWM bilateral.  There is no clonus noted.  The hallux is downgoing bilateral.      Derm: Nails are normal. Skin is supple with normal texture and turgor noted.  Webspaces are clean, dry and intact bilateral.  There are no hyperkeratoses, ulcerations, verruca or other lesions noted.      Ortho: Muscle strength is 5/5 for all pedal groups tested.   Grossly misshapened feet bilateral.  Patient has had previous foot surgery resulting in pedal deformity.  Small ganglion present anterior ankle Assessment/Plan     Diagnoses and all orders for this visit:  Ganglion, right ankle and foot  Foot deformity, bilateral      Cyst is asymptomatic, I do not feel patient needs treatment for this.  Patient does have deformity of the foot but she is able to walk.  Do not feel she should have any more surgical intervention.                  [1] No family history on file.  [2]   Current Outpatient Medications   Medication Sig Dispense Refill    Bystolic 2.5 mg tablet Take 1 tablet (2.5 mg) by mouth once daily.      cyanocobalamin (Vitamin B-12) 1,000 mcg tablet Take 1 tablet (1,000 mcg) by mouth once daily.      levothyroxine (Synthroid, Levoxyl) 75 mcg tablet Take 1 tablet (75 mcg) by mouth once daily.      lisinopriL-hydrochlorothiazide 20-25 mg tablet Take 1 tablet by mouth once daily.      rosuvastatin (Crestor) 20 mg tablet Take 1 tablet (20 mg) by mouth.      zolpidem (Ambien) 5 mg tablet Take 1 tablet (5 mg) by mouth as needed at bedtime.       No current facility-administered medications for this visit.

## 2025-05-06 ENCOUNTER — OFFICE VISIT (OUTPATIENT)
Dept: CARDIOLOGY | Facility: CLINIC | Age: 75
End: 2025-05-06
Payer: MEDICARE

## 2025-05-06 VITALS
DIASTOLIC BLOOD PRESSURE: 84 MMHG | HEART RATE: 85 BPM | BODY MASS INDEX: 34.36 KG/M2 | SYSTOLIC BLOOD PRESSURE: 126 MMHG | HEIGHT: 61 IN | OXYGEN SATURATION: 96 % | WEIGHT: 182 LBS

## 2025-05-06 DIAGNOSIS — I51.9 MILD DIASTOLIC DYSFUNCTION: ICD-10-CM

## 2025-05-06 DIAGNOSIS — I10 BENIGN ESSENTIAL HYPERTENSION: ICD-10-CM

## 2025-05-06 DIAGNOSIS — I48.0 PAROXYSMAL ATRIAL FIBRILLATION (MULTI): Primary | ICD-10-CM

## 2025-05-06 PROCEDURE — 1159F MED LIST DOCD IN RCRD: CPT | Performed by: INTERNAL MEDICINE

## 2025-05-06 PROCEDURE — 3079F DIAST BP 80-89 MM HG: CPT | Performed by: INTERNAL MEDICINE

## 2025-05-06 PROCEDURE — 93005 ELECTROCARDIOGRAM TRACING: CPT | Performed by: INTERNAL MEDICINE

## 2025-05-06 PROCEDURE — 93010 ELECTROCARDIOGRAM REPORT: CPT | Performed by: INTERNAL MEDICINE

## 2025-05-06 PROCEDURE — 1160F RVW MEDS BY RX/DR IN RCRD: CPT | Performed by: INTERNAL MEDICINE

## 2025-05-06 PROCEDURE — 1036F TOBACCO NON-USER: CPT | Performed by: INTERNAL MEDICINE

## 2025-05-06 PROCEDURE — 3074F SYST BP LT 130 MM HG: CPT | Performed by: INTERNAL MEDICINE

## 2025-05-06 PROCEDURE — 99214 OFFICE O/P EST MOD 30 MIN: CPT | Performed by: INTERNAL MEDICINE

## 2025-05-06 PROCEDURE — 99214 OFFICE O/P EST MOD 30 MIN: CPT | Mod: 25 | Performed by: INTERNAL MEDICINE

## 2025-05-06 RX ORDER — NEBIVOLOL HYDROCHLORIDE 2.5 MG/1
2.5 TABLET ORAL DAILY
Qty: 90 TABLET | Refills: 3 | Status: SHIPPED | OUTPATIENT
Start: 2025-05-06 | End: 2025-05-06

## 2025-05-06 RX ORDER — NEBIVOLOL 2.5 MG/1
2.5 TABLET ORAL DAILY
Qty: 90 TABLET | Refills: 3 | Status: SHIPPED | OUTPATIENT
Start: 2025-05-06 | End: 2026-05-06

## 2025-05-06 RX ORDER — CALCIUM CARBONATE 500(1250)
1 TABLET ORAL
COMMUNITY

## 2025-05-06 RX ORDER — CHOLECALCIFEROL (VITAMIN D3) 25 MCG
25 TABLET ORAL DAILY
COMMUNITY

## 2025-05-06 RX ORDER — ASPIRIN 81 MG/1
81 TABLET ORAL DAILY
COMMUNITY

## 2025-05-06 NOTE — PROGRESS NOTES
Chief Complaint:   No chief complaint on file.     History Of Present Illness:    Jennifer Boogie is a 75 y.o. female with a history of paroxysmal atrial fibrillation, diastolic dysfunction, hypertension, and iron deficiency anemia here for follow-up.    Transfusion and then iron infusions.  Her hemoglobin is now stable.    She still has not experienced any palpitations.  Denies any chest pain or shortness of breath.    She says that she gained about 40 pounds when her thyroid was initially treated.  She has not been able to lose that weight.  Her gait is affected by her weight gain.    30-day ambulatory monitor December 2024: Sinus rhythm with rare PACs and PVCs.  No evidence of atrial fibrillation    Echocardiogram 11/15/2017: EF 65 to 70%.  Left ventricular hypertrophy noted.  Grade 1 diastolic dysfunction.  Trace MR and TR.  RVSP 25 mmHg.    Stress echocardiogram 11/15/2017: Modified Mauro protocol exercising 5 minutes and 3 seconds.  EF 55 to 60% at baseline increasing to 65 to 70% at peak stress.    Ambulatory monitor 11/15/2017: Predominantly sinus rhythm and sinus bradycardia with brief episode of paroxysmal atrial fibrillation.     Past Medical History:  She has a past medical history of A-fib (Multi) and Hypertension.    Past Surgical History:  She has a past surgical history that includes Other surgical history (08/03/2020); Other surgical history (08/03/2020); Other surgical history (08/03/2020); Other surgical history (08/03/2020); Other surgical history (08/03/2020); and Other surgical history (08/03/2020).      Social History:  She reports that she has never smoked. She has never used smokeless tobacco. She reports that she does not currently use alcohol. She reports that she does not currently use drugs.    Family History:  No family history on file.     Allergies:  Patient has no known allergies.    Outpatient Medications:  Current Outpatient Medications   Medication Instructions    aspirin 81 mg,  "Daily    Bystolic 2.5 mg, Daily    calcium carbonate (Oscal) 500 mg calcium (1,250 mg) tablet 1 tablet, 2 times daily (morning and late afternoon)    cholecalciferol (VITAMIN D3) 25 mcg, Daily    cyanocobalamin (VITAMIN B-12) 1,000 mcg, Daily    levothyroxine (SYNTHROID, LEVOXYL) 75 mcg, Daily    lisinopriL-hydrochlorothiazide 20-25 mg tablet 1 tablet, Daily    rosuvastatin (CRESTOR) 20 mg    zolpidem (AMBIEN) 5 mg, Nightly PRN       Last Recorded Vitals:  Visit Vitals  /84 (BP Location: Left arm, Patient Position: Sitting)   Pulse 85   Ht 1.549 m (5' 1\")   Wt 82.6 kg (182 lb)   SpO2 96%   BMI 34.39 kg/m²   Smoking Status Never   BSA 1.89 m²      LASTWT(3):   Wt Readings from Last 3 Encounters:   05/06/25 82.6 kg (182 lb)   10/22/24 83.9 kg (185 lb)   03/05/24 86.6 kg (191 lb)       Physical Exam:  In general: alert and in no acute distress.   HEENT: Carotid upstrokes normal with no bruits. JVP is normal.   Pulmonary: Clear to auscultation bilaterally.  Cardiovascular: S1, S2, regular. No appreciable murmurs, rubs or gallops.   Lower extremities: Warm. 2+ distal pulses. No edema.     Last Labs:  CBC -  Recent Labs     02/15/24  1555 07/26/23  0442 07/25/23  0535   WBC 10.7 CANCELED 15.7*   HGB 7.0* CANCELED 9.8*   HCT 24.1* CANCELED 30.5*    CANCELED 273   MCV 69* CANCELED 89       CMP -  Recent Labs     02/15/24  1555 07/26/23  0442 07/25/23  0535    CANCELED 131*   K 3.8 CANCELED 4.2   CL 99 CANCELED 99   CO2 24 CANCELED 23   ANIONGAP 18 CANCELED 13   BUN 32* CANCELED 19   CREATININE 0.90 CANCELED 0.66   EGFR 66  --   --    MG 1.66  --   --      Recent Labs     02/15/24  1555 10/18/22  0847   ALBUMIN 4.0 3.9   ALKPHOS 68 62   ALT 12 15   AST 17 17   BILITOT 0.3 0.6       LIPID PANEL -   No results for input(s): \"CHOL\", \"LDLCALC\", \"LDLF\", \"HDL\", \"TRIG\" in the last 91282 hours.  Lipid panel 11/13/2023: Total cholesterol 122, triglycerides 146, HDL 60 and LDL 43.    Recent Labs     04/16/25  1007 " 10/16/24  1006 06/12/24  0919   HGBA1C 7.2* 6.8* 6.4*     Hgb 10/24 was 13.8      Assessment/Plan   1) paroxysmal atrial fibrillation: No complaints of palpitations to suggest any symptomatic recurrence of atrial fibrillation.  We had a lengthy discussion about the atrial fibrillation.  She had 1 documented episode in 2017 by monitoring but did not even feel it at that time.  We talked about the fact that this could have been her only episode of atrial fibrillation or the fact that she could have had episodes of atrial fibrillation that she is not aware of.  Fortunately her last ambulatory monitor demonstrated no atrial fibrillation.    We talked about her elevated [id number] score of 4 for age, gender and hypertension.  If she is still having paroxysmal atrial fibrillation this score would bring a recommendation for the use of long-term oral anticoagulation.  This would be difficult in her because of her history of anemia.    We talked about looking into an Apple Watch with rhythm detection.  If she were to wear 1 of these watches and have no detected atrial fibrillation then I would feel comfortable with holding off on the discussion of thromboembolic prophylaxis therapy.  If there was evidence of more frequent atrial fibrillation that I would asked the patient to be evaluated for a Watchman device as I believe her bleeding risk/anemia would be too high for the use of long-term oral anticoagulation.    2) hypertension: Blood pressure control.  Continue with lisinopril/hydrochlorothiazide and Bystolic.    3) diastolic dysfunction: No evidence of volume overload.  Continue to observe.    4) follow-up: 12 months or sooner if needed.      Lyndon Mcdonald MD

## 2025-07-07 ENCOUNTER — OFFICE VISIT (OUTPATIENT)
Dept: URGENT CARE | Age: 75
End: 2025-07-07
Payer: MEDICARE

## 2025-07-07 VITALS
HEIGHT: 61 IN | HEART RATE: 86 BPM | SYSTOLIC BLOOD PRESSURE: 135 MMHG | RESPIRATION RATE: 19 BRPM | TEMPERATURE: 98.9 F | BODY MASS INDEX: 33.99 KG/M2 | WEIGHT: 180 LBS | OXYGEN SATURATION: 95 % | DIASTOLIC BLOOD PRESSURE: 60 MMHG

## 2025-07-07 DIAGNOSIS — J01.00 ACUTE NON-RECURRENT MAXILLARY SINUSITIS: Primary | ICD-10-CM

## 2025-07-07 DIAGNOSIS — H65.01 NON-RECURRENT ACUTE SEROUS OTITIS MEDIA OF RIGHT EAR: ICD-10-CM

## 2025-07-07 RX ORDER — AMOXICILLIN AND CLAVULANATE POTASSIUM 875; 125 MG/1; MG/1
1 TABLET, FILM COATED ORAL 2 TIMES DAILY
Qty: 20 TABLET | Refills: 0 | Status: SHIPPED | OUTPATIENT
Start: 2025-07-07 | End: 2025-07-17

## 2025-07-07 ASSESSMENT — ENCOUNTER SYMPTOMS
LOSS OF SENSATION IN FEET: 0
DEPRESSION: 0
OCCASIONAL FEELINGS OF UNSTEADINESS: 0

## 2025-07-07 ASSESSMENT — PATIENT HEALTH QUESTIONNAIRE - PHQ9
SUM OF ALL RESPONSES TO PHQ9 QUESTIONS 1 AND 2: 0
1. LITTLE INTEREST OR PLEASURE IN DOING THINGS: NOT AT ALL
2. FEELING DOWN, DEPRESSED OR HOPELESS: NOT AT ALL

## 2025-07-07 NOTE — PROGRESS NOTES
"Subjective   Patient ID: Jennifer Boogie is a 75 y.o. female. She presents today with a chief complaint of Sinusitis (X1 wk feels full ).    History of Present Illness  Subjective  Jennifer Boogie is a 75 y.o. female who presents for evaluation of possible sinusitis. Symptoms include right ear pressure/pain, nasal congestion, and sinus pressure. Onset of symptoms was 1 week ago and has been unchanged since that time. Treatment to date: decongestants. She denies fevers. No cough or shortness of breath is reported.          Sinusitis      Past Medical History  Allergies as of 07/07/2025    (No Known Allergies)       Prescriptions Prior to Admission[1]     Medical History[2]    Surgical History[3]     reports that she has never smoked. She has never been exposed to tobacco smoke. She has never used smokeless tobacco. She reports that she does not currently use alcohol. She reports that she does not currently use drugs.    Review of Systems  Review of Systems                               Objective    Vitals:    07/07/25 0916   BP: 135/60   Pulse: 86   Resp: 19   Temp: 37.2 °C (98.9 °F)   SpO2: 95%   Weight: 81.6 kg (180 lb)   Height: (!) 1.549 m (5' 1\")     No LMP recorded (lmp unknown). Patient is postmenopausal.    Physical Exam  Vitals and nursing note reviewed.   Constitutional:       General: She is not in acute distress.     Appearance: Normal appearance. She is not ill-appearing or toxic-appearing.   HENT:      Right Ear: Ear canal and external ear normal.      Left Ear: Tympanic membrane, ear canal and external ear normal.      Nose: Congestion and rhinorrhea present.      Mouth/Throat:      Mouth: Mucous membranes are moist.      Pharynx: Oropharynx is clear.   Eyes:      Extraocular Movements: Extraocular movements intact.      Conjunctiva/sclera: Conjunctivae normal.      Pupils: Pupils are equal, round, and reactive to light.   Cardiovascular:      Rate and Rhythm: Normal rate and regular rhythm.      Pulses: " Normal pulses.      Heart sounds: Normal heart sounds.   Pulmonary:      Effort: Pulmonary effort is normal.      Breath sounds: Normal breath sounds. No wheezing, rhonchi or rales.   Musculoskeletal:      Cervical back: Normal range of motion and neck supple. No rigidity or tenderness.   Lymphadenopathy:      Cervical: No cervical adenopathy.   Skin:     Capillary Refill: Capillary refill takes less than 2 seconds.   Neurological:      Mental Status: She is alert.         Procedures    Point of Care Test & Imaging Results from this visit  No results found for this visit on 07/07/25.   Imaging  No results found.    Cardiology, Vascular, and Other Imaging  No other imaging results found for the past 2 days      Diagnostic study results (if any) were reviewed by Alicia Hunter MD.    Assessment/Plan   Allergies, medications, history, and pertinent labs/EKGs/Imaging reviewed by Alicia Hunter MD.     Medical Decision Making      Orders and Diagnoses  There are no diagnoses linked to this encounter.    Medical Admin Record      Patient disposition: Home    Electronically signed by Alicia Hunter MD  9:21 AM           [1] (Not in a hospital admission)  [2]   Past Medical History:  Diagnosis Date    A-fib (Multi)     Hypertension    [3]   Past Surgical History:  Procedure Laterality Date    OTHER SURGICAL HISTORY  08/03/2020    Thyroid surgery    OTHER SURGICAL HISTORY  08/03/2020    Hysterectomy    OTHER SURGICAL HISTORY  08/03/2020    Foot surgery    OTHER SURGICAL HISTORY  08/03/2020    Cholecystectomy    OTHER SURGICAL HISTORY  08/03/2020    Bunionectomy    OTHER SURGICAL HISTORY  08/03/2020    Bladder surgery